# Patient Record
Sex: FEMALE | Race: WHITE | Employment: FULL TIME | URBAN - METROPOLITAN AREA
[De-identification: names, ages, dates, MRNs, and addresses within clinical notes are randomized per-mention and may not be internally consistent; named-entity substitution may affect disease eponyms.]

---

## 2017-03-04 ENCOUNTER — HOSPITAL ENCOUNTER (OUTPATIENT)
Dept: NEUROLOGY | Age: 37
Discharge: HOME | End: 2017-03-04
Payer: COMMERCIAL

## 2017-03-04 PROCEDURE — 95816 EEG AWAKE AND DROWSY: CPT

## 2017-11-12 ENCOUNTER — PATIENT MESSAGE (OUTPATIENT)
Dept: FAMILY MEDICINE CLINIC | Age: 37
End: 2017-11-12

## 2017-11-13 RX ORDER — LEVETIRACETAM 500 MG/1
500 TABLET ORAL 3 TIMES DAILY
Qty: 90 TABLET | Refills: 1 | Status: SHIPPED | OUTPATIENT
Start: 2017-11-13 | End: 2017-12-21 | Stop reason: SDUPTHER

## 2017-11-13 NOTE — TELEPHONE ENCOUNTER
From: Ely Hastings  To: Gregor Gao MD  Sent: 11/12/2017 8:10 PM EST  Subject: Lynn George,     I wanted to notify you that I had my yearly flu shot. I had it done at [facility] at [facility] in [place]. They said they were going to notify you. But they haven't.      Thanks,   Ely Hastings

## 2017-12-21 NOTE — TELEPHONE ENCOUNTER
From: Brendon Short  Sent: 12/20/2017 5:08 PM EST  Subject: Medication Renewal Request    Brendon Short would like a refill of the following medications:  levETIRAcetam (KEPPRA) 500 MG tablet Elyse Simon MD]    Preferred pharmacy: [address] [place] [address] , [address] [facility] [phone] [place] [phone]    Comment:

## 2017-12-22 RX ORDER — LEVETIRACETAM 500 MG/1
500 TABLET ORAL 3 TIMES DAILY
Qty: 30 TABLET | Refills: 0 | Status: SHIPPED | OUTPATIENT
Start: 2017-12-22 | End: 2018-01-06 | Stop reason: SDUPTHER

## 2018-01-06 ENCOUNTER — OFFICE VISIT (OUTPATIENT)
Dept: FAMILY MEDICINE CLINIC | Age: 38
End: 2018-01-06

## 2018-01-06 VITALS
HEART RATE: 84 BPM | RESPIRATION RATE: 18 BRPM | BODY MASS INDEX: 39.16 KG/M2 | TEMPERATURE: 98.1 F | SYSTOLIC BLOOD PRESSURE: 114 MMHG | DIASTOLIC BLOOD PRESSURE: 68 MMHG | WEIGHT: 221 LBS | HEIGHT: 63 IN | OXYGEN SATURATION: 96 %

## 2018-01-06 DIAGNOSIS — R53.83 FATIGUE, UNSPECIFIED TYPE: ICD-10-CM

## 2018-01-06 DIAGNOSIS — G40.909 NONINTRACTABLE EPILEPSY WITHOUT STATUS EPILEPTICUS, UNSPECIFIED EPILEPSY TYPE (HCC): ICD-10-CM

## 2018-01-06 DIAGNOSIS — G40.909 NONINTRACTABLE EPILEPSY WITHOUT STATUS EPILEPTICUS, UNSPECIFIED EPILEPSY TYPE (HCC): Primary | ICD-10-CM

## 2018-01-06 LAB
ALBUMIN SERPL-MCNC: 4.2 G/DL (ref 3.9–4.9)
ALP BLD-CCNC: 65 U/L (ref 40–130)
ALT SERPL-CCNC: 28 U/L (ref 0–33)
ANION GAP SERPL CALCULATED.3IONS-SCNC: 12 MEQ/L (ref 7–13)
AST SERPL-CCNC: 23 U/L (ref 0–35)
BILIRUB SERPL-MCNC: 0.5 MG/DL (ref 0–1.2)
BUN BLDV-MCNC: 10 MG/DL (ref 6–20)
CALCIUM SERPL-MCNC: 8.8 MG/DL (ref 8.6–10.2)
CHLORIDE BLD-SCNC: 102 MEQ/L (ref 98–107)
CO2: 28 MEQ/L (ref 22–29)
CREAT SERPL-MCNC: 0.47 MG/DL (ref 0.5–0.9)
GFR AFRICAN AMERICAN: >60
GFR NON-AFRICAN AMERICAN: >60
GLOBULIN: 2.7 G/DL (ref 2.3–3.5)
GLUCOSE BLD-MCNC: 91 MG/DL (ref 74–109)
HCT VFR BLD CALC: 40.1 % (ref 37–47)
HEMOGLOBIN: 13.2 G/DL (ref 12–16)
MCH RBC QN AUTO: 31 PG (ref 27–31.3)
MCHC RBC AUTO-ENTMCNC: 32.8 % (ref 33–37)
MCV RBC AUTO: 94.4 FL (ref 82–100)
PDW BLD-RTO: 13.4 % (ref 11.5–14.5)
PLATELET # BLD: 315 K/UL (ref 130–400)
POTASSIUM SERPL-SCNC: 4.4 MEQ/L (ref 3.5–5.1)
RBC # BLD: 4.25 M/UL (ref 4.2–5.4)
SODIUM BLD-SCNC: 142 MEQ/L (ref 132–144)
T4 FREE: 1.26 NG/DL (ref 0.93–1.7)
TOTAL PROTEIN: 6.9 G/DL (ref 6.4–8.1)
TSH SERPL DL<=0.05 MIU/L-ACNC: 2.39 UIU/ML (ref 0.27–4.2)
WBC # BLD: 6.2 K/UL (ref 4.8–10.8)

## 2018-01-06 PROCEDURE — 99214 OFFICE O/P EST MOD 30 MIN: CPT | Performed by: FAMILY MEDICINE

## 2018-01-06 RX ORDER — LEVETIRACETAM 500 MG/1
500 TABLET ORAL 2 TIMES DAILY
Qty: 180 TABLET | Refills: 0 | Status: SHIPPED | OUTPATIENT
Start: 2018-01-06 | End: 2018-01-06 | Stop reason: SDUPTHER

## 2018-01-06 RX ORDER — LEVETIRACETAM 500 MG/1
500 TABLET ORAL 2 TIMES DAILY
Qty: 180 TABLET | Refills: 3 | Status: SHIPPED | OUTPATIENT
Start: 2018-01-06 | End: 2018-08-27 | Stop reason: SDUPTHER

## 2018-01-06 NOTE — PROGRESS NOTES
mg/dL Final    HDL 11/05/2016 55  40 - 59 mg/dL Final    Comment: ATP III HDL Cholesterol Classification is Desirable. Expected Values:    Males:    >55 = No Risk            35-55 = Moderate Risk            <35 = High Risk    Females:  >65 = No Risk            45-65 = Moderate Risk            <45 = High Risk    NCEP Guidelines: Third Report May 2001  >59 = negative risk factor for CHD  <40 = major risk factor for CHD      LDL Calculated 11/05/2016 98  0 - 129 mg/dL Final     Health Maintenance   Topic Date Due    HIV screen  12/26/1996    DTaP/Tdap/Td vaccine (1 - Tdap) 12/26/2000    Cervical cancer screen  01/01/2015    Flu vaccine  Completed       No results found for this visit on 01/06/18. Objective    Vitals:    01/06/18 0809   BP: 114/68   Pulse: 84   Resp: 18   Temp: 98.1 °F (36.7 °C)   TempSrc: Tympanic   SpO2: 96%   Weight: 221 lb (100.2 kg)   Height: 5' 3\" (1.6 m)       PHYSICAL EXAMINATION:        GENERAL:    The patient appears well nourished and well-developed,     Normal affect. Not appearing significantly anxious or depressed. No acute respiratory distress. Alert and oriented times 3. Skin:     No skin rashes. No concerning moles observed. Gait:    Normal gait. No ataxia. HEENT:  Normocephalic, atraumatic. Throat:  Pharynx is clear, no erythema/ edema or exudates   Ears:    TMs normal bilaterally. Canals and ears normal   Eyes:  Extraocular eye motions intact and pain free. Pupils reactive/equal    Sclerae and conjunctivae clear    NECK: No masses or adenopathy palpable. No carotid bruits heard. No asymmetry visible. No thyromegaly. RESPIRATORY:   Clear/ Equal breath sounds /No acute respiratory distress. No wheezes,rales, or rhonchi. No percussive abnormalities    HEART: Regular rhythm without murmur, rub or gallop. ABDOMEN:  Soft, non tender. No masses, guarding or rebound. Normo active bowel sounds.        EXTREMITIES:  No edema

## 2018-01-08 LAB — LAMOTRIGINE LEVEL: 7.8 UG/ML (ref 2.5–15)

## 2018-04-02 RX ORDER — LAMOTRIGINE 200 MG/1
200 TABLET ORAL 2 TIMES DAILY
Qty: 180 TABLET | Refills: 1 | Status: SHIPPED | OUTPATIENT
Start: 2018-04-02 | End: 2018-08-27 | Stop reason: SDUPTHER

## 2018-06-26 ENCOUNTER — PATIENT MESSAGE (OUTPATIENT)
Dept: FAMILY MEDICINE CLINIC | Age: 38
End: 2018-06-26

## 2018-08-27 RX ORDER — LEVETIRACETAM 500 MG/1
500 TABLET ORAL 2 TIMES DAILY
Qty: 180 TABLET | Refills: 1 | Status: SHIPPED | OUTPATIENT
Start: 2018-08-27 | End: 2019-01-28 | Stop reason: SDUPTHER

## 2018-08-27 RX ORDER — LAMOTRIGINE 200 MG/1
200 TABLET ORAL 2 TIMES DAILY
Qty: 180 TABLET | Refills: 1 | Status: SHIPPED | OUTPATIENT
Start: 2018-08-27 | End: 2019-01-28 | Stop reason: SDUPTHER

## 2018-08-27 NOTE — TELEPHONE ENCOUNTER
From: Joe Gipson  Sent: 8/26/2018 6:36 PM EDT  Subject: Medication Renewal Request    Joe Gipson would like a refill of the following medications:     levETIRAcetam (KEPPRA) 500 MG tablet Priscila Del Cid MD]     lamoTRIgine (LAMICTAL) 200 MG tablet Priscila Del Cid MD]    Preferred pharmacy: [address] [place] [address] , [address] [facility] [phone] - F [phone]

## 2018-11-01 ENCOUNTER — PATIENT MESSAGE (OUTPATIENT)
Dept: FAMILY MEDICINE CLINIC | Age: 38
End: 2018-11-01

## 2018-11-13 ENCOUNTER — TELEPHONE (OUTPATIENT)
Dept: FAMILY MEDICINE CLINIC | Age: 38
End: 2018-11-13

## 2019-01-28 RX ORDER — LEVETIRACETAM 500 MG/1
500 TABLET ORAL 2 TIMES DAILY
Qty: 180 TABLET | Refills: 1 | Status: SHIPPED | OUTPATIENT
Start: 2019-01-28 | End: 2019-06-10 | Stop reason: SDUPTHER

## 2019-01-28 RX ORDER — LAMOTRIGINE 200 MG/1
200 TABLET ORAL 2 TIMES DAILY
Qty: 180 TABLET | Refills: 1 | Status: SHIPPED | OUTPATIENT
Start: 2019-01-28 | End: 2019-06-10 | Stop reason: SDUPTHER

## 2019-05-01 ENCOUNTER — TELEPHONE (OUTPATIENT)
Dept: FAMILY MEDICINE CLINIC | Age: 39
End: 2019-05-01

## 2019-05-03 ENCOUNTER — TELEPHONE (OUTPATIENT)
Dept: FAMILY MEDICINE CLINIC | Age: 39
End: 2019-05-03

## 2019-06-10 ENCOUNTER — OFFICE VISIT (OUTPATIENT)
Dept: FAMILY MEDICINE CLINIC | Age: 39
End: 2019-06-10
Payer: COMMERCIAL

## 2019-06-10 VITALS
WEIGHT: 226.4 LBS | HEIGHT: 65 IN | BODY MASS INDEX: 37.72 KG/M2 | DIASTOLIC BLOOD PRESSURE: 74 MMHG | OXYGEN SATURATION: 99 % | HEART RATE: 70 BPM | TEMPERATURE: 98.3 F | SYSTOLIC BLOOD PRESSURE: 110 MMHG

## 2019-06-10 DIAGNOSIS — G40.909 NONINTRACTABLE EPILEPSY WITHOUT STATUS EPILEPTICUS, UNSPECIFIED EPILEPSY TYPE (HCC): Primary | ICD-10-CM

## 2019-06-10 PROCEDURE — 99214 OFFICE O/P EST MOD 30 MIN: CPT | Performed by: FAMILY MEDICINE

## 2019-06-10 RX ORDER — LAMOTRIGINE 200 MG/1
200 TABLET ORAL 2 TIMES DAILY
Qty: 180 TABLET | Refills: 1 | Status: SHIPPED | OUTPATIENT
Start: 2019-06-10 | End: 2019-12-08 | Stop reason: SDUPTHER

## 2019-06-10 RX ORDER — LEVETIRACETAM 500 MG/1
500 TABLET ORAL 2 TIMES DAILY
Qty: 180 TABLET | Refills: 1 | Status: SHIPPED | OUTPATIENT
Start: 2019-06-10 | End: 2019-12-08 | Stop reason: SDUPTHER

## 2019-06-10 ASSESSMENT — PATIENT HEALTH QUESTIONNAIRE - PHQ9
1. LITTLE INTEREST OR PLEASURE IN DOING THINGS: 0
SUM OF ALL RESPONSES TO PHQ9 QUESTIONS 1 & 2: 0
SUM OF ALL RESPONSES TO PHQ QUESTIONS 1-9: 0
2. FEELING DOWN, DEPRESSED OR HOPELESS: 0
SUM OF ALL RESPONSES TO PHQ QUESTIONS 1-9: 0

## 2019-06-10 NOTE — PROGRESS NOTES
Chief Complaint   Patient presents with    Established New Doctor        HPI: Rajendra Jones 41 y.o. female presenting for     New to provider. Old PCP left. Patient is here to establish care. Epilepsy   Patient is on Lamictal and keppra 500 mg BID and stable on the medication. No issues with the medication. Last seizures was December 2018. Admits some times gets absence seizures. Reports that seizures are triggered by lack of sleep    Current Outpatient Medications   Medication Sig Dispense Refill    levETIRAcetam (KEPPRA) 500 MG tablet Take 1 tablet by mouth 2 times daily 180 tablet 1    lamoTRIgine (LAMICTAL) 200 MG tablet Take 1 tablet by mouth 2 times daily 180 tablet 1     No current facility-administered medications for this visit. ROS  CONSTITUTIONAL: The patient denies fevers, chills, sweats and body ache. HEENT: Denies headache, blurry vision, eye pain, tinnitus, vertigo,  sore throat, neck or thyroid masses. RESPIRATORY: Denies cough, sputum, hemoptysis. CARDIAC: Denies chest pain, pressure, palpitations, Denies lower extremity edema. GASTROINTESTINAL: Denies abdominal pain, constipation, diarrhea, bleeding in the stools,   GENITOURINARY: Denies dysuria, hematuria, nocturia or frequency, urinary incontinence. NEUROLOGIC: Denies headaches, dizziness, syncope, weakness. Admits to a history of seizures. MUSCULOSKELETAL: denies changes in range of motion, joint pain, stiffness. ENDOCRINOLOGY: Denies heat or cold intolerance. HEMATOLOGY: Denies easy bleeding or blood transfusion,anemia  DERMATOLOGY: Denies changes in moles or pigmentation changes. PSYCHIATRY: Denies depression, agitation or anxiety. Past Medical History:   Diagnosis Date    Epilepsy ([facility] [address] 2006, 2012    last grand mal 2016        No past surgical history on file.      Family History   Problem Relation Age of Onset    High Blood Pressure Father         Social History     Socioeconomic History    Marital status: epilepsy type ([facility]  Stable on the medication. Will obtain levels. Continue the medication.    - levETIRAcetam (KEPPRA) 500 MG tablet; Take 1 tablet by mouth 2 times daily  Dispense: 180 tablet; Refill: 1  - lamoTRIgine (LAMICTAL) 200 MG tablet; Take 1 tablet by mouth 2 times daily  Dispense: 180 tablet;  Refill: 1

## 2019-06-26 DIAGNOSIS — G40.909 NONINTRACTABLE EPILEPSY WITHOUT STATUS EPILEPTICUS, UNSPECIFIED EPILEPSY TYPE (HCC): ICD-10-CM

## 2019-06-26 LAB
ALBUMIN SERPL-MCNC: 4.6 G/DL (ref 3.5–4.6)
ALP BLD-CCNC: 79 U/L (ref 40–130)
ALT SERPL-CCNC: 11 U/L (ref 0–33)
ANION GAP SERPL CALCULATED.3IONS-SCNC: 15 MEQ/L (ref 9–15)
AST SERPL-CCNC: 17 U/L (ref 0–35)
BASOPHILS ABSOLUTE: 0.1 K/UL (ref 0–0.2)
BASOPHILS RELATIVE PERCENT: 0.8 %
BILIRUB SERPL-MCNC: 0.7 MG/DL (ref 0.2–0.7)
BUN BLDV-MCNC: 8 MG/DL (ref 6–20)
CALCIUM SERPL-MCNC: 9 MG/DL (ref 8.5–9.9)
CHLORIDE BLD-SCNC: 105 MEQ/L (ref 95–107)
CO2: 23 MEQ/L (ref 20–31)
CREAT SERPL-MCNC: 0.59 MG/DL (ref 0.5–0.9)
EOSINOPHILS ABSOLUTE: 0.1 K/UL (ref 0–0.7)
EOSINOPHILS RELATIVE PERCENT: 1.4 %
GFR AFRICAN AMERICAN: >60
GFR NON-AFRICAN AMERICAN: >60
GLOBULIN: 3 G/DL (ref 2.3–3.5)
GLUCOSE BLD-MCNC: 73 MG/DL (ref 70–99)
HCT VFR BLD CALC: 39.9 % (ref 37–47)
HEMOGLOBIN: 13.8 G/DL (ref 12–16)
LYMPHOCYTES ABSOLUTE: 2 K/UL (ref 1–4.8)
LYMPHOCYTES RELATIVE PERCENT: 30.4 %
MCH RBC QN AUTO: 32.4 PG (ref 27–31.3)
MCHC RBC AUTO-ENTMCNC: 34.6 % (ref 33–37)
MCV RBC AUTO: 93.7 FL (ref 82–100)
MONOCYTES ABSOLUTE: 0.6 K/UL (ref 0.2–0.8)
MONOCYTES RELATIVE PERCENT: 8.8 %
NEUTROPHILS ABSOLUTE: 3.9 K/UL (ref 1.4–6.5)
NEUTROPHILS RELATIVE PERCENT: 58.6 %
PDW BLD-RTO: 13.8 % (ref 11.5–14.5)
PLATELET # BLD: 342 K/UL (ref 130–400)
POTASSIUM SERPL-SCNC: 4.7 MEQ/L (ref 3.4–4.9)
RBC # BLD: 4.25 M/UL (ref 4.2–5.4)
SODIUM BLD-SCNC: 143 MEQ/L (ref 135–144)
TOTAL PROTEIN: 7.6 G/DL (ref 6.3–8)
WBC # BLD: 6.7 K/UL (ref 4.8–10.8)

## 2019-06-28 LAB — LAMOTRIGINE LEVEL: 8.6 UG/ML (ref 2.5–15)

## 2019-07-29 ENCOUNTER — OFFICE VISIT (OUTPATIENT)
Dept: FAMILY MEDICINE CLINIC | Age: 39
End: 2019-07-29
Payer: COMMERCIAL

## 2019-07-29 VITALS
DIASTOLIC BLOOD PRESSURE: 70 MMHG | HEART RATE: 82 BPM | BODY MASS INDEX: 37.65 KG/M2 | OXYGEN SATURATION: 99 % | TEMPERATURE: 96.9 F | WEIGHT: 226 LBS | HEIGHT: 65 IN | SYSTOLIC BLOOD PRESSURE: 110 MMHG

## 2019-07-29 DIAGNOSIS — R51.9 INTRACTABLE HEADACHE, UNSPECIFIED CHRONICITY PATTERN, UNSPECIFIED HEADACHE TYPE: Primary | ICD-10-CM

## 2019-07-29 DIAGNOSIS — R56.9 SEIZURE (HCC): ICD-10-CM

## 2019-07-29 PROCEDURE — 96372 THER/PROPH/DIAG INJ SC/IM: CPT | Performed by: FAMILY MEDICINE

## 2019-07-29 PROCEDURE — 99214 OFFICE O/P EST MOD 30 MIN: CPT | Performed by: FAMILY MEDICINE

## 2019-07-29 RX ORDER — KETOROLAC TROMETHAMINE 30 MG/ML
60 INJECTION, SOLUTION INTRAMUSCULAR; INTRAVENOUS ONCE
Status: COMPLETED | OUTPATIENT
Start: 2019-07-29 | End: 2019-07-29

## 2019-07-29 RX ADMIN — KETOROLAC TROMETHAMINE 60 MG: 30 INJECTION, SOLUTION INTRAMUSCULAR; INTRAVENOUS at 11:11

## 2019-07-29 NOTE — PROGRESS NOTES
Chief Complaint   Patient presents with   Napoleon Victoria     pt has been having right ear pain diziines just on right side and headaches only on right side for 2 weeks tried IBU for headaches         HPI: Jasmyn Melena 41 y.o. female presenting for     Otalgia and migraines. Has been going on for the last 2 weeks. Ear pain behind the ear. Patient reports that it is on the right side. Associated with a headache and dizziness. Patient reports that she has been having absent seizures. These increase when she has the headaches. reports that she takes ibuprofen with the headaches. Headaches are happening everyday. Can wake up with the headaches. Can last 2-3 hours at a time. Pain is 6-7 brings it down to a 2 with 400 mg ibuprofen. Denies any discharge. Patient admits that she had her glasses changed recently. Patient does not think that it is affecting her eyes. Current Outpatient Medications   Medication Sig Dispense Refill    levETIRAcetam (KEPPRA) 500 MG tablet Take 1 tablet by mouth 2 times daily 180 tablet 1    lamoTRIgine (LAMICTAL) 200 MG tablet Take 1 tablet by mouth 2 times daily 180 tablet 1     No current facility-administered medications for this visit. ROS  CONSTITUTIONAL: The patient denies fevers, chills, sweats and body ache. HEENT:, blurry vision, eye pain, tinnitus, vertigo,  sore throat, neck or thyroid masses. Right ear pain. Admits to a headaches. RESPIRATORY: Denies cough, sputum, hemoptysis. CARDIAC: Denies chest pain, pressure, palpitations, Denies lower extremity edema. GASTROINTESTINAL: Denies abdominal pain, constipation, diarrhea, bleeding in the stools,   GENITOURINARY: Denies dysuria, hematuria, nocturia or frequency, urinary incontinence. NEUROLOGIC: Denies headaches, dizziness, syncope, weakness  MUSCULOSKELETAL: denies changes in range of motion, joint pain, stiffness. ENDOCRINOLOGY: Denies heat or cold intolerance.    HEMATOLOGY: Denies easy bleeding or blood transfusion,anemia  DERMATOLOGY: Denies changes in moles or pigmentation changes. PSYCHIATRY: Denies depression, agitation or anxiety. Past Medical History:   Diagnosis Date    Epilepsy [facility]) 2006, 2012    last grand mal 2016        History reviewed. No pertinent surgical history.      Family History   Problem Relation Age of Onset    High Blood Pressure Father         Social History     Socioeconomic History    Marital status: Single     Spouse name: Not on file    Number of children: Not on file    Years of education: Not on file    Highest education level: Not on file   Occupational History    Not on file   Social Needs    Financial resource strain: Not on file    Food insecurity:     Worry: Not on file     Inability: Not on file    Transportation needs:     Medical: Not on file     Non-medical: Not on file   Tobacco Use    Smoking status: Never Smoker    Smokeless tobacco: Never Used   Substance and Sexual Activity    Alcohol use: No    Drug use: Not on file    Sexual activity: Not on file   Lifestyle    Physical activity:     Days per week: Not on file     Minutes per session: Not on file    Stress: Not on file   Relationships    Social connections:     Talks on phone: Not on file     Gets together: Not on file     Attends [employer] service: Not on file     Active member of club or organization: Not on file     Attends meetings of clubs or organizations: Not on file     Relationship status: Not on file    Intimate partner violence:     Fear of current or ex partner: Not on file     Emotionally abused: Not on file     Physically abused: Not on file     Forced sexual activity: Not on file   Other Topics Concern    Not on file   Social History Narrative    Not on file        /70 (Site: Right Upper Arm, Position: Sitting, Cuff Size: Large Adult)   Pulse 82   Temp 96.9 °F (36.1 °C)   Ht 5' 5\" (1.651 m)   Wt 226 lb (102.5 kg)   SpO2 99%   BMI 37.61 kg/m²        Physical Exam:    General appearance - alert, well appearing, and in no distress  Mental Status - alert, oriented to person, place, and time  Eyes - pupils equal and reactive, extraocular eye movements intact   Ears - bilateral TM's and external ear canals normal   Nose - normal and patent, no erythema, discharge or polyps   Sinuses - Normal paranasal sinuses without tenderness   Throat - mucous membranes moist, pharynx normal without lesions   Neck - supple, no significant adenopathy   Thyroid - thyroid is normal in size without nodules or tenderness    Chest - clear to auscultation, no wheezes, rales or rhonchi, symmetric air entry   Heart - normal rate, regular rhythm, normal S1, S2, no murmurs, rubs, clicks or gallops  Abdomen - soft, nontender, nondistended, no masses or organomegaly   Back exam - full range of motion, no tenderness, palpable spasm or pain on motion   Neurological - alert, oriented, normal speech, no focal findings or movement disorder noted   Musculoskeletal - no joint tenderness, deformity or swelling   Extremities - peripheral pulses normal, no pedal edema, no clubbing or cyanosis   Skin - normal coloration and turgor, no rashes, no suspicious skin lesions noted    Labs   No results found for: TSHREFLEX  TSH   Date Value Ref Range Status   01/06/2018 2.390 0.270 - 4.200 uIU/mL Final     Lab Results   Component Value Date     06/26/2019    K 4.7 06/26/2019     06/26/2019    CO2 23 06/26/2019    BUN 8 06/26/2019    CREATININE 0.59 06/26/2019    GLUCOSE 73 06/26/2019    CALCIUM 9.0 06/26/2019    PROT 7.6 06/26/2019    LABALBU 4.6 06/26/2019    BILITOT 0.7 06/26/2019    ALKPHOS 79 06/26/2019    AST 17 06/26/2019    ALT 11 06/26/2019    LABGLOM >60.0 06/26/2019    GFRAA >60.0 06/26/2019    GLOB 3.0 06/26/2019       Lab Results   Component Value Date    WBC 6.7 06/26/2019    HGB 13.8 06/26/2019    HCT 39.9 06/26/2019    MCV 93.7 06/26/2019     06/26/2019     No results found for:

## 2019-12-08 DIAGNOSIS — G40.909 NONINTRACTABLE EPILEPSY WITHOUT STATUS EPILEPTICUS, UNSPECIFIED EPILEPSY TYPE (HCC): ICD-10-CM

## 2019-12-09 RX ORDER — LEVETIRACETAM 500 MG/1
TABLET ORAL
Qty: 180 TABLET | Refills: 1 | Status: SHIPPED | OUTPATIENT
Start: 2019-12-09 | End: 2020-06-04

## 2019-12-09 RX ORDER — LAMOTRIGINE 200 MG/1
TABLET ORAL
Qty: 180 TABLET | Refills: 1 | Status: SHIPPED | OUTPATIENT
Start: 2019-12-09 | End: 2020-06-04

## 2020-06-04 RX ORDER — LEVETIRACETAM 500 MG/1
TABLET ORAL
Qty: 180 TABLET | Refills: 1 | Status: SHIPPED | OUTPATIENT
Start: 2020-06-04 | End: 2020-11-18

## 2020-06-04 RX ORDER — LAMOTRIGINE 200 MG/1
TABLET ORAL
Qty: 180 TABLET | Refills: 1 | Status: SHIPPED | OUTPATIENT
Start: 2020-06-04 | End: 2020-11-18

## 2020-06-04 NOTE — TELEPHONE ENCOUNTER
Pharmacy is requesting medication refill. Please approve or deny this request.    Rx requested:  Requested Prescriptions     Pending Prescriptions Disp Refills    lamoTRIgine (LAMICTAL) 200 MG tablet [Pharmacy Med Name: LAMOTRIGINE  TAB 200MG] 180 tablet 1     Sig: TAKE 1 TABLET TWICE A DAY    levETIRAcetam (KEPPRA) 500 MG tablet [Pharmacy Med Name: Josefina Roel TAB 500MG] 180 tablet 1     Sig: TAKE 1 TABLET TWICE A DAY         Last Office Visit:   7/29/2019      Next Visit Date:  No future appointments.

## 2020-07-13 ENCOUNTER — NURSE TRIAGE (OUTPATIENT)
Dept: OTHER | Facility: CLINIC | Age: 40
End: 2020-07-13

## 2020-07-15 ENCOUNTER — VIRTUAL VISIT (OUTPATIENT)
Dept: FAMILY MEDICINE CLINIC | Age: 40
End: 2020-07-15
Payer: COMMERCIAL

## 2020-07-15 PROCEDURE — 99213 OFFICE O/P EST LOW 20 MIN: CPT | Performed by: FAMILY MEDICINE

## 2020-07-15 RX ORDER — MAGNESIUM CARB/ALUMINUM HYDROX 105-160MG
296 TABLET,CHEWABLE ORAL ONCE
Qty: 296 ML | Refills: 0 | Status: SHIPPED | OUTPATIENT
Start: 2020-07-15 | End: 2020-07-15

## 2020-07-15 NOTE — PROGRESS NOTES
7/15/2020    TELEHEALTH EVALUATION -- Audio/Visual (During [id number]-65 public health emergency)    HPI:    Lety Mcdonald (:  1980) has requested an audio/video evaluation for the following concern(s):    Constipation  Patient complains of constipation. Onset was 2 weeks ago. Patient has been having frequent pellet like stools yesterday. Defecation has been difficult. Co-Morbid conditions:obesity. Symptoms have waxed and waned. Current Health Habits: Eating fiber? yes - , Exercise? Unknown, Adequate hydration? yes . Current over the counter/prescription laxative: Colace which has been somewhat effective. vomitied last time was yesterday morning. Symptoms started end of. Patient has been taking the colace BID. Patient reports that she has been having belching and vomiting. Denies any abdominal distension. Patient admits to having some some temperature of 100. Patient denies any fever today. Patient denies any pain now. Patient reports that the bowel movement as pellet like. Patient has used in the past.          Review of Systems   Constitutional: Negative for activity change, appetite change, fatigue and fever. HENT: Negative for congestion, dental problem, facial swelling, hearing loss, rhinorrhea, sinus pain, sore throat, tinnitus and trouble swallowing. Eyes: Negative for photophobia, discharge, itching and visual disturbance. Respiratory: Negative for apnea, chest tightness, shortness of breath and stridor. Cardiovascular: Negative for chest pain and leg swelling. Gastrointestinal: Positive for abdominal pain and constipation. Negative for abdominal distention, nausea and rectal pain. Endocrine: Negative for cold intolerance, heat intolerance, polyphagia and polyuria. Genitourinary: Negative for difficulty urinating and dyspareunia. Allergic/Immunologic: Negative for environmental allergies and food allergies.    Neurological: Negative for dizziness, tremors, seizures, facial asymmetry and speech difficulty. Hematological: Negative for adenopathy. Does not bruise/bleed easily. Psychiatric/Behavioral: Negative for behavioral problems, confusion and sleep disturbance. The patient is not nervous/anxious and is not hyperactive. Prior to Visit Medications    Medication Sig Taking?  Authorizing Provider   Magnesium Citrate (RA MAGNESIUM CITRATE) 1.745 GM/30ML solution Take 296 mLs by mouth once for 1 dose Yes Bisi Park MD   lamoTRIgine (LAMICTAL) 200 MG tablet TAKE 1 TABLET TWICE A DAY  Elena Hopson MD   levETIRAcetam (KEPPRA) 500 MG tablet TAKE 1 TABLET TWICE A DAY  Elena Hopson MD       Social History     Tobacco Use    Smoking status: Never Smoker    Smokeless tobacco: Never Used   Substance Use Topics    Alcohol use: No    Drug use: Not on file        No Known Allergies,   Past Medical History:   Diagnosis Date    Epilepsy ([facility] [address] 2006, 2012    last grand mal 2016   , No past surgical history on file.,   Social History     Tobacco Use    Smoking status: Never Smoker    Smokeless tobacco: Never Used   Substance Use Topics    Alcohol use: No    Drug use: Not on file   ,   Family History   Problem Relation Age of Onset    High Blood Pressure Father    ,   Immunization History   Administered Date(s) Administered    Influenza Vaccine, unspecified formulation 11/04/2016    Influenza Virus Vaccine 09/16/2017    Influenza, MDCK Quadv, IM, PF (Flucelvax 4 yrs and older) 11/04/2016, 10/13/2018    Influenza, Quadv, IM, PF (6 mo and older Fluzone, Flulaval, Fluarix, and 3 yrs and older Afluria) 11/01/2019    Typhoid Vi capsular polysaccharide (Typhim VI) 11/09/2018   ,   Health Maintenance   Topic Date Due    Varicella vaccine (1 of 2 - 2-dose childhood series) 12/26/1982    HIV screen  12/26/1996    DTaP/Tdap/Td vaccine (1 - Tdap) 12/26/2000    Cervical cancer screen  01/01/2015    Flu vaccine (1) 09/01/2020    Hepatitis A vaccine  Aged [place] Hepatitis B vaccine  Aged Out    Hib vaccine  Aged Out    Meningococcal (ACWY) vaccine  Aged Out    Pneumococcal 0-64 years Vaccine  Aged Out       PHYSICAL EXAMINATION:  [ INSTRUCTIONS:  \"[x]\" Indicates a positive item  \"[]\" Indicates a negative item  -- DELETE ALL ITEMS NOT EXAMINED]  Vital Signs: (As obtained by patient/caregiver or practitioner observation)    Blood pressure-  Heart rate-    Respiratory rate-    Temperature-  Pulse oximetry-     Constitutional: [x] Appears well-developed and well-nourished [x] No apparent distress      [] Abnormal-   Mental status  [x] Alert and awake  [] Oriented to person/place/time []Able to follow commands      Eyes:  EOM    [x]  Normal  [] Abnormal-  Sclera  [x]  Normal  [] Abnormal -         Discharge []  None visible  [] Abnormal -    HENT:   [x] Normocephalic, atraumatic. [] Abnormal   [] Mouth/Throat: Mucous membranes are moist.     External Ears [x] Normal  [] Abnormal-     Neck: [x] No visualized mass     Pulmonary/Chest: [x] Respiratory effort normal.  [] No visualized signs of difficulty breathing or respiratory distress        [] Abnormal-      Musculoskeletal:   [x] Normal gait with no signs of ataxia         [x] Normal range of motion of neck        [] Abnormal-       Neurological:        [x] No Facial Asymmetry (Cranial nerve 7 motor function) (limited exam to video visit)          [] No gaze palsy        [] Abnormal-         Skin:        [x] No significant exanthematous lesions or discoloration noted on facial skin         [] Abnormal-            Psychiatric:       [x] Normal Affect [] No Hallucinations        [] Abnormal-     Other pertinent observable physical exam findings-     ASSESSMENT/PLAN:  1. Constipation, unspecified constipation type    - Magnesium Citrate (RA MAGNESIUM CITRATE) 1.745 GM/30ML solution; Take 296 mLs by mouth once for 1 dose  Dispense: 296 mL; Refill: 0      No follow-ups on file.     Sandro Alcaraz is a 46 y.o. female being evaluated by a Virtual Visit (video visit) encounter to address concerns as mentioned above. A caregiver was present when appropriate. Due to this being a TeleHealth encounter (During [facility] public health emergency), evaluation of the following organ systems was limited: Vitals/Constitutional/EENT/Resp/CV/GI//MS/Neuro/Skin/Heme-Lymph-Imm. Pursuant to the emergency declaration under the [address] [place] [address] and the [employer] Act, this Virtual Visit was conducted with patient's (and/or legal guardian's) consent, to reduce the patient's risk of exposure to COVID-19 and provide necessary medical care. The patient (and/or legal guardian) has also been advised to contact this office for worsening conditions or problems, and seek emergency medical treatment and/or call 911 if deemed necessary. Patient identification was verified at the start of the visit: Yes    Total time spent on this encounter: 15 minutes    Services were provided through a video synchronous discussion virtually to substitute for in-person clinic visit. Patient and provider were located at their individual homes. --Sebastien Landis MD on 7/15/2020 at 4:04 PM    An electronic signature was used to authenticate this note.

## 2020-07-16 ASSESSMENT — PATIENT HEALTH QUESTIONNAIRE - PHQ9
SUM OF ALL RESPONSES TO PHQ QUESTIONS 1-9: 0
1. LITTLE INTEREST OR PLEASURE IN DOING THINGS: 0
SUM OF ALL RESPONSES TO PHQ9 QUESTIONS 1 & 2: 0
SUM OF ALL RESPONSES TO PHQ QUESTIONS 1-9: 0
2. FEELING DOWN, DEPRESSED OR HOPELESS: 0

## 2020-07-16 ASSESSMENT — ENCOUNTER SYMPTOMS
SORE THROAT: 0
RHINORRHEA: 0
EYE ITCHING: 0
ABDOMINAL DISTENTION: 0
CONSTIPATION: 1
APNEA: 0
PHOTOPHOBIA: 0
SINUS PAIN: 0
FACIAL SWELLING: 0
RECTAL PAIN: 0
SHORTNESS OF BREATH: 0
STRIDOR: 0
NAUSEA: 0
EYE DISCHARGE: 0
CHEST TIGHTNESS: 0
TROUBLE SWALLOWING: 0
ABDOMINAL PAIN: 1

## 2020-11-18 RX ORDER — LEVETIRACETAM 500 MG/1
TABLET ORAL
Qty: 180 TABLET | Refills: 0 | Status: SHIPPED | OUTPATIENT
Start: 2020-11-18 | End: 2021-02-16

## 2020-11-18 RX ORDER — LAMOTRIGINE 200 MG/1
TABLET ORAL
Qty: 180 TABLET | Refills: 0 | Status: SHIPPED | OUTPATIENT
Start: 2020-11-18 | End: 2021-02-16

## 2020-12-30 ENCOUNTER — OFFICE VISIT (OUTPATIENT)
Dept: FAMILY MEDICINE CLINIC | Age: 40
End: 2020-12-30
Payer: COMMERCIAL

## 2020-12-30 VITALS
HEIGHT: 65 IN | DIASTOLIC BLOOD PRESSURE: 70 MMHG | WEIGHT: 231.6 LBS | TEMPERATURE: 97.8 F | OXYGEN SATURATION: 99 % | HEART RATE: 82 BPM | BODY MASS INDEX: 38.59 KG/M2 | SYSTOLIC BLOOD PRESSURE: 110 MMHG

## 2020-12-30 PROCEDURE — 99395 PREV VISIT EST AGE 18-39: CPT | Performed by: FAMILY MEDICINE

## 2020-12-30 RX ORDER — PHENTERMINE HYDROCHLORIDE 37.5 MG/1
37.5 TABLET ORAL
Qty: 30 TABLET | Refills: 0 | Status: SHIPPED | OUTPATIENT
Start: 2020-12-30 | End: 2021-01-29

## 2020-12-30 SDOH — ECONOMIC STABILITY: FOOD INSECURITY: WITHIN THE PAST 12 MONTHS, THE FOOD YOU BOUGHT JUST DIDN'T LAST AND YOU DIDN'T HAVE MONEY TO GET MORE.: NEVER TRUE

## 2020-12-30 SDOH — ECONOMIC STABILITY: INCOME INSECURITY: HOW HARD IS IT FOR YOU TO PAY FOR THE VERY BASICS LIKE FOOD, HOUSING, MEDICAL CARE, AND HEATING?: NOT VERY HARD

## 2020-12-30 SDOH — ECONOMIC STABILITY: TRANSPORTATION INSECURITY
IN THE PAST 12 MONTHS, HAS LACK OF TRANSPORTATION KEPT YOU FROM MEETINGS, WORK, OR FROM GETTING THINGS NEEDED FOR DAILY LIVING?: NO

## 2020-12-30 SDOH — ECONOMIC STABILITY: FOOD INSECURITY: WITHIN THE PAST 12 MONTHS, YOU WORRIED THAT YOUR FOOD WOULD RUN OUT BEFORE YOU GOT MONEY TO BUY MORE.: NEVER TRUE

## 2020-12-30 SDOH — ECONOMIC STABILITY: TRANSPORTATION INSECURITY
IN THE PAST 12 MONTHS, HAS THE LACK OF TRANSPORTATION KEPT YOU FROM MEDICAL APPOINTMENTS OR FROM GETTING MEDICATIONS?: NO

## 2020-12-30 ASSESSMENT — PATIENT HEALTH QUESTIONNAIRE - PHQ9
SUM OF ALL RESPONSES TO PHQ9 QUESTIONS 1 & 2: 2
SUM OF ALL RESPONSES TO PHQ QUESTIONS 1-9: 2
2. FEELING DOWN, DEPRESSED OR HOPELESS: 2
1. LITTLE INTEREST OR PLEASURE IN DOING THINGS: 0
SUM OF ALL RESPONSES TO PHQ QUESTIONS 1-9: 2
SUM OF ALL RESPONSES TO PHQ QUESTIONS 1-9: 2

## 2020-12-30 NOTE — PROGRESS NOTES
Chief Complaint   Patient presents with    Annual Exam     States she would like to be screened for heart conditions. States she has a strong family hx of heart dz. HPI: Kirk Green 45 y.o. female presenting for       Patient is here for an annual examination. Carissa reports that her father recently had surgery for an aortic aneurysm (also has a history of heart disease). Due to that, it was recommended that patient be evaluated for cardiovascular disease. Patient father was recently had a surgery for an aortic aneurynsm. The surgeons recommeneded that she gets evaluated for cardiovasuclar disease. Carissa exercises and tries to eliminate salt her diet. Intermittently snores at night. Patient denies any sad feelings or depressed feelings. Patient is interested in losing weight. Current Outpatient Medications   Medication Sig Dispense Refill    Multiple Vitamins-Minerals (MULTIVITAMIN ADULTS PO) Take by mouth      lamoTRIgine (LAMICTAL) 200 MG tablet TAKE 1 TABLET TWICE A  tablet 0    levETIRAcetam (KEPPRA) 500 MG tablet TAKE 1 TABLET TWICE A  tablet 0     No current facility-administered medications for this visit. ROS  CONSTITUTIONAL: The patient denies fevers, chills, sweats and body ache. HEENT:, blurry vision, eye pain, tinnitus, vertigo,  sore throat, neck or thyroid masses. Right ear pain. Admits to a headaches. RESPIRATORY: Denies cough, sputum, hemoptysis. CARDIAC: Denies chest pain, pressure, palpitations, Denies lower extremity edema. GASTROINTESTINAL: Denies abdominal pain, constipation, diarrhea, bleeding in the stools,   GENITOURINARY: Denies dysuria, hematuria, nocturia or frequency, urinary incontinence. NEUROLOGIC: Denies headaches, dizziness, syncope, weakness  MUSCULOSKELETAL: denies changes in range of motion, joint pain, stiffness. ENDOCRINOLOGY: Denies heat or cold intolerance.    HEMATOLOGY: Denies easy bleeding or blood transfusion,anemia  DERMATOLOGY: Denies changes in moles or pigmentation changes. PSYCHIATRY: Denies depression, agitation or anxiety.     Past Medical History:   Diagnosis Date    Epilepsy ([address] 2006, 2012    last grand mal 2016        Past Surgical History:   Procedure Laterality Date    WISDOM TOOTH EXTRACTION          Family History   Problem Relation Age of Onset    High Blood Pressure Father     Heart Disease Father         Social History     Socioeconomic History    Marital status: Single     Spouse name: Not on file    Number of children: Not on file    Years of education: Not on file    Highest education level: Not on file   Occupational History    Not on file   Social Needs    Financial resource strain: Not very hard    Food insecurity     Worry: Never true     Inability: Never true    Transportation needs     Medical: No     Non-medical: No   Tobacco Use    Smoking status: Never Smoker    Smokeless tobacco: Never Used   Substance and Sexual Activity    Alcohol use: No    Drug use: Not on file    Sexual activity: Not on file   Lifestyle    Physical activity     Days per week: Not on file     Minutes per session: Not on file    Stress: Not on file   Relationships    Social connections     Talks on phone: Not on file     Gets together: Not on file     Attends Mu-ism service: Not on file     Active member of club or organization: Not on file     Attends meetings of clubs or organizations: Not on file     Relationship status: Not on file    Intimate partner violence     Fear of current or ex partner: Not on file     Emotionally abused: Not on file     Physically abused: Not on file     Forced sexual activity: Not on file   Other Topics Concern    Not on file   Social History Narrative    Not on file        /70 (Site: Right Upper Arm, Position: Sitting, Cuff Size: Medium Adult)   Pulse 97   Temp 97.8 °F (36.6 °C) (Oral)   Ht 5' 5\" (1.651 m)   Wt 231 lb 9.6 oz (105.1 kg) SpO2 99%   BMI 38.54 kg/m²        Physical Exam:    General appearance - alert, well appearing, and in no distress  Mental Status - alert, oriented to person, place, and time  Eyes - pupils equal and reactive, extraocular eye movements intact   Ears - bilateral TM's and external ear canals normal   Nose - normal and patent, no erythema, discharge or polyps   Sinuses - Normal paranasal sinuses without tenderness   Throat - mucous membranes moist, pharynx normal without lesions   Neck - supple, no significant adenopathy   Thyroid - thyroid is normal in size without nodules or tenderness    Chest - clear to auscultation, no wheezes, rales or rhonchi, symmetric air entry   Heart - slightly tachycardic but improved.   No irregular  rhythm, normal S1, S2, no murmurs, rubs, clicks or gallops  Abdomen - soft, nontender, nondistended, no masses or organomegaly   Back exam - full range of motion, no tenderness, palpable spasm or pain on motion   Neurological - alert, oriented, normal speech, no focal findings or movement disorder noted   Musculoskeletal - no joint tenderness, deformity or swelling   Extremities - peripheral pulses normal, no pedal edema, no clubbing or cyanosis   Skin - normal coloration and turgor, no rashes, no suspicious skin lesions noted    Labs   No results found for: TSHREFLEX  TSH   Date Value Ref Range Status   01/06/2018 2.390 0.270 - 4.200 uIU/mL Final     Lab Results   Component Value Date     06/26/2019    K 4.7 06/26/2019     06/26/2019    CO2 23 06/26/2019    BUN 8 06/26/2019    CREATININE 0.59 06/26/2019    GLUCOSE 73 06/26/2019    CALCIUM 9.0 06/26/2019    PROT 7.6 06/26/2019    LABALBU 4.6 06/26/2019    BILITOT 0.7 06/26/2019    ALKPHOS 79 06/26/2019    AST 17 06/26/2019    ALT 11 06/26/2019    LABGLOM >60.0 06/26/2019    GFRAA >60.0 06/26/2019    GLOB 3.0 06/26/2019       Lab Results   Component Value Date    WBC 6.7 06/26/2019    HGB 13.8 06/26/2019    HCT 39.9 06/26/2019 MCV 93.7 2019     2019     No results found for: LABA1C  No results found for: EAG      A/P: Lucy Apgar 45 y.o. female presenting for       1. Nonintractable epilepsy without status epilepticus, unspecified epilepsy type ([facility]      2. Encounter for screening for HIV    - HIV-1,2 Combo Ag/Ab By ROWENA, Reflexive Panel; Future  - CBC With Auto Differential; Future    3. Need for hepatitis C screening test    - Hepatitis C Antibody; Future    4. Lipid screening    - Lipid, Fasting; Future    5. Class 2 obesity due to excess calories without serious comorbidity with body mass index (BMI) of 38.0 to 38.9 in adult    - phentermine (ADIPEX-P) 37.5 MG tablet; Take 1 tablet by mouth every morning (before breakfast) for 30 days. Dispense: 30 tablet; Refill: 0    6. Physical exam    - Comprehensive Metabolic Panel; Future    7. Palpitations    - TSH with Reflex;  Future

## 2020-12-30 NOTE — PATIENT INSTRUCTIONS
Lifestyle changes   What you can do today to start improving your health. .. Diet and lifestyle choices are major factors that impact overall health. Many chronic disease can be prevented, made less severe, or even cured with a good diet and other lifestyle modifications. Follow these few tips as often as possible and keep them in your mind as you go about your daily routine. 1) Avoid salt (sodium) in your diet to decrease blood pressure. Salt is hidden in many common grocery store food items, food at restaurants, fast food, and is often added by people to their food. It is a major cause of high blood pressure which, if uncontrolled, can lead to heart disease, kidney disease, and many other serious health problems. You can find sodium levels on the nutrition labels of many common foods. 2) Avoid highly processed and packaged foods as much as possible. These foods are found everywhere in grocery stores. They most often contain added chemicals and preservatives not natural to our bodies. They also often contain excessive salt, and sugar which may lead to diabetes and high blood pressure. 3) Try to shop the perimeter of the grocery store. The best foods that should be eaten in abundance are vegetables, fruit, lean meats such as chicken and fish, nuts and seeds, low fat or fat free dairy products like yogurt and skim milk. Although there are exceptions to this, most of the packaged and processed foods are located between the [facility]. Red meat should be limited as it has been linked to heart disease and certain cancers. 4) Avoid sugary/processed, high carbohydrate foods such as white breads and white rice, pastries. Having a high carbohydrate diet full of food like cakes, cookies, chips, fast food, white bread, frozen dinners, white rice, just to nama a few, can lead to diabetes, the consequences of which can be devastating to health. Studies have shown that foods high in sugar are addicting.  Always opt for alcohol and tobacco a few hours before sleep  - avoid large meals close to bedtime  - try to establish regular bed times that are consistent every day   11) Try to find good stress reducing techniques. Often our lives get stressful with work, bills, and many other responsibilities. Set aside time for yourself every day to de-stress. One of the many benefits of exercise is to counteract the effects of stress in our lives. Stress over many months and years can lead to weight gain, sleep problems, anxiety, depression, and many other problems. Find something you like to do, whether it is exercise, taking a walk, reading a book, or some other technique to help relieve the stress and break up the day. Keep in mind that many choric illnesses such as pain, cancers, depression and anxiety, obesity, diabetes, high blood pressure, and many other issues, are a direct result of our lifestyles. Following these tips can help improve your overall health. If you want to discuss any of these issues in detail, I would be glad to have a conversation with you.

## 2021-01-18 ENCOUNTER — TELEPHONE (OUTPATIENT)
Dept: FAMILY MEDICINE CLINIC | Age: 41
End: 2021-01-18

## 2021-01-18 DIAGNOSIS — Z11.4 ENCOUNTER FOR SCREENING FOR HIV: ICD-10-CM

## 2021-01-18 DIAGNOSIS — Z13.220 LIPID SCREENING: ICD-10-CM

## 2021-01-18 DIAGNOSIS — Z00.00 PHYSICAL EXAM: ICD-10-CM

## 2021-01-18 DIAGNOSIS — R00.2 PALPITATIONS: ICD-10-CM

## 2021-01-18 DIAGNOSIS — Z11.59 NEED FOR HEPATITIS C SCREENING TEST: ICD-10-CM

## 2021-01-18 LAB
ALBUMIN SERPL-MCNC: 4.1 G/DL (ref 3.5–4.6)
ALP BLD-CCNC: 67 U/L (ref 40–130)
ALT SERPL-CCNC: 10 U/L (ref 0–33)
ANION GAP SERPL CALCULATED.3IONS-SCNC: 13 MEQ/L (ref 9–15)
AST SERPL-CCNC: 15 U/L (ref 0–35)
BASOPHILS ABSOLUTE: 0.1 K/UL (ref 0–0.2)
BASOPHILS RELATIVE PERCENT: 0.9 %
BILIRUB SERPL-MCNC: 0.4 MG/DL (ref 0.2–0.7)
BUN BLDV-MCNC: 9 MG/DL (ref 6–20)
CALCIUM SERPL-MCNC: 9.3 MG/DL (ref 8.5–9.9)
CHLORIDE BLD-SCNC: 102 MEQ/L (ref 95–107)
CHOLESTEROL, FASTING: 163 MG/DL (ref 0–199)
CO2: 23 MEQ/L (ref 20–31)
CREAT SERPL-MCNC: 0.6 MG/DL (ref 0.5–0.9)
EOSINOPHILS ABSOLUTE: 0.2 K/UL (ref 0–0.7)
EOSINOPHILS RELATIVE PERCENT: 2.5 %
GFR AFRICAN AMERICAN: >60
GFR NON-AFRICAN AMERICAN: >60
GLOBULIN: 3.4 G/DL (ref 2.3–3.5)
GLUCOSE BLD-MCNC: 84 MG/DL (ref 70–99)
HCT VFR BLD CALC: 40.4 % (ref 37–47)
HDLC SERPL-MCNC: 48 MG/DL (ref 40–59)
HEMOGLOBIN: 13.3 G/DL (ref 12–16)
HEPATITIS C ANTIBODY INTERPRETATION: NORMAL
LDL CHOLESTEROL CALCULATED: 104 MG/DL (ref 0–129)
LYMPHOCYTES ABSOLUTE: 2.5 K/UL (ref 1–4.8)
LYMPHOCYTES RELATIVE PERCENT: 35.5 %
MCH RBC QN AUTO: 30.2 PG (ref 27–31.3)
MCHC RBC AUTO-ENTMCNC: 32.9 % (ref 33–37)
MCV RBC AUTO: 91.8 FL (ref 82–100)
MONOCYTES ABSOLUTE: 0.6 K/UL (ref 0.2–0.8)
MONOCYTES RELATIVE PERCENT: 8.6 %
NEUTROPHILS ABSOLUTE: 3.7 K/UL (ref 1.4–6.5)
NEUTROPHILS RELATIVE PERCENT: 52.5 %
PDW BLD-RTO: 13.9 % (ref 11.5–14.5)
PLATELET # BLD: 357 K/UL (ref 130–400)
POTASSIUM SERPL-SCNC: 4.2 MEQ/L (ref 3.4–4.9)
RBC # BLD: 4.4 M/UL (ref 4.2–5.4)
SODIUM BLD-SCNC: 138 MEQ/L (ref 135–144)
TOTAL PROTEIN: 7.5 G/DL (ref 6.3–8)
TRIGLYCERIDE, FASTING: 53 MG/DL (ref 0–150)
TSH REFLEX: 2.58 UIU/ML (ref 0.44–3.86)
WBC # BLD: 7 K/UL (ref 4.8–10.8)

## 2021-01-19 LAB — HIV AG/AB: NONREACTIVE

## 2021-02-16 DIAGNOSIS — G40.909 NONINTRACTABLE EPILEPSY WITHOUT STATUS EPILEPTICUS, UNSPECIFIED EPILEPSY TYPE (HCC): ICD-10-CM

## 2021-02-16 RX ORDER — LEVETIRACETAM 500 MG/1
TABLET ORAL
Qty: 180 TABLET | Refills: 0 | Status: SHIPPED | OUTPATIENT
Start: 2021-02-16 | End: 2021-05-18

## 2021-02-16 RX ORDER — LAMOTRIGINE 200 MG/1
TABLET ORAL
Qty: 180 TABLET | Refills: 0 | Status: SHIPPED | OUTPATIENT
Start: 2021-02-16 | End: 2021-05-18

## 2021-05-17 DIAGNOSIS — G40.909 NONINTRACTABLE EPILEPSY WITHOUT STATUS EPILEPTICUS, UNSPECIFIED EPILEPSY TYPE (HCC): ICD-10-CM

## 2021-05-18 RX ORDER — LAMOTRIGINE 200 MG/1
TABLET ORAL
Qty: 180 TABLET | Refills: 0 | Status: SHIPPED | OUTPATIENT
Start: 2021-05-18 | End: 2021-08-16

## 2021-05-18 RX ORDER — LEVETIRACETAM 500 MG/1
TABLET ORAL
Qty: 180 TABLET | Refills: 0 | Status: SHIPPED | OUTPATIENT
Start: 2021-05-18 | End: 2021-08-16

## 2021-05-18 NOTE — TELEPHONE ENCOUNTER
Rx requested:  Requested Prescriptions     Pending Prescriptions Disp Refills    levETIRAcetam (KEPPRA) 500 MG tablet [Pharmacy Med Name: LEVETIRACETM TAB 500MG] 180 tablet 0     Sig: TAKE 1 TABLET TWICE A DAY    lamoTRIgine (LAMICTAL) 200 MG tablet [Pharmacy Med Name: LAMOTRIGINE  TAB 200MG] 180 tablet 0     Sig: TAKE 1 TABLET TWICE A DAY       Last Office Visit:   12/30/2020        Next Visit Date:  No future appointments.

## 2021-08-15 DIAGNOSIS — G40.909 NONINTRACTABLE EPILEPSY WITHOUT STATUS EPILEPTICUS, UNSPECIFIED EPILEPSY TYPE (HCC): ICD-10-CM

## 2021-08-16 RX ORDER — LAMOTRIGINE 200 MG/1
TABLET ORAL
Qty: 180 TABLET | Refills: 0 | Status: SHIPPED | OUTPATIENT
Start: 2021-08-16 | End: 2021-11-08

## 2021-08-16 RX ORDER — LEVETIRACETAM 500 MG/1
TABLET ORAL
Qty: 180 TABLET | Refills: 0 | Status: SHIPPED | OUTPATIENT
Start: 2021-08-16 | End: 2021-11-08

## 2021-08-16 NOTE — TELEPHONE ENCOUNTER
Requesting medication refill. Please approve or deny this request.    Rx requested:  Requested Prescriptions     Pending Prescriptions Disp Refills    lamoTRIgine (LAMICTAL) 200 MG tablet [Pharmacy Med Name: LAMOTRIGINE  TAB 200MG] 180 tablet 0     Sig: TAKE 1 TABLET TWICE A DAY    levETIRAcetam (KEPPRA) 500 MG tablet [Pharmacy Med Name: Pattie Cable TAB 500MG] 180 tablet 0     Sig: TAKE 1 TABLET TWICE A DAY       Last Office Visit:   12/30/2020    Last Filled:      Last Labs:      Next Visit Date:  No future appointments.

## 2021-11-06 DIAGNOSIS — G40.909 NONINTRACTABLE EPILEPSY WITHOUT STATUS EPILEPTICUS, UNSPECIFIED EPILEPSY TYPE (HCC): ICD-10-CM

## 2021-11-08 RX ORDER — LEVETIRACETAM 500 MG/1
TABLET ORAL
Qty: 180 TABLET | Refills: 0 | Status: SHIPPED | OUTPATIENT
Start: 2021-11-08 | End: 2022-03-07 | Stop reason: SDUPTHER

## 2021-11-08 RX ORDER — LAMOTRIGINE 200 MG/1
TABLET ORAL
Qty: 180 TABLET | Refills: 0 | Status: SHIPPED | OUTPATIENT
Start: 2021-11-08 | End: 2022-03-07 | Stop reason: SDUPTHER

## 2021-11-08 NOTE — TELEPHONE ENCOUNTER
Requesting medication refill. Please approve or deny this request.    Rx requested:  Requested Prescriptions     Pending Prescriptions Disp Refills    levETIRAcetam (KEPPRA) 500 MG tablet [Pharmacy Med Name: Ami Bible TAB 500MG] 180 tablet 0     Sig: TAKE 1 TABLET TWICE A DAY    lamoTRIgine (LAMICTAL) 200 MG tablet [Pharmacy Med Name: LAMOTRIGINE  TAB 200MG] 180 tablet 0     Sig: TAKE 1 TABLET TWICE A DAY       Last Office Visit:   12/30/20    Last Filled:      Last Labs:      Next Visit Date:  No future appointments.

## 2022-02-07 ENCOUNTER — HOSPITAL ENCOUNTER (EMERGENCY)
Age: 42
Discharge: HOME | End: 2022-02-07
Payer: COMMERCIAL

## 2022-02-07 ENCOUNTER — APPOINTMENT (OUTPATIENT)
Dept: GENERAL RADIOLOGY | Age: 42
End: 2022-02-07
Payer: COMMERCIAL

## 2022-02-07 VITALS
WEIGHT: 211 LBS | RESPIRATION RATE: 18 BRPM | HEIGHT: 64 IN | DIASTOLIC BLOOD PRESSURE: 83 MMHG | SYSTOLIC BLOOD PRESSURE: 132 MMHG | BODY MASS INDEX: 36.02 KG/M2 | TEMPERATURE: 98.5 F | HEART RATE: 87 BPM | OXYGEN SATURATION: 100 %

## 2022-02-07 DIAGNOSIS — S63.642A SPRAIN OF METACARPOPHALANGEAL (MCP) JOINT OF LEFT THUMB, INITIAL ENCOUNTER: Primary | ICD-10-CM

## 2022-02-07 PROCEDURE — 6370000000 HC RX 637 (ALT 250 FOR IP): Performed by: PHYSICIAN ASSISTANT

## 2022-02-07 PROCEDURE — 29125 APPL SHORT ARM SPLINT STATIC: CPT

## 2022-02-07 PROCEDURE — 73130 X-RAY EXAM OF HAND: CPT

## 2022-02-07 PROCEDURE — 73110 X-RAY EXAM OF WRIST: CPT

## 2022-02-07 PROCEDURE — 99283 EMERGENCY DEPT VISIT LOW MDM: CPT

## 2022-02-07 RX ORDER — HYDROCODONE BITARTRATE AND ACETAMINOPHEN 5; 325 MG/1; MG/1
1 TABLET ORAL EVERY 8 HOURS PRN
Qty: 9 TABLET | Refills: 0 | Status: SHIPPED | OUTPATIENT
Start: 2022-02-07 | End: 2022-02-10

## 2022-02-07 RX ORDER — ONDANSETRON 4 MG/1
4 TABLET, ORALLY DISINTEGRATING ORAL ONCE
Status: COMPLETED | OUTPATIENT
Start: 2022-02-07 | End: 2022-02-07

## 2022-02-07 RX ORDER — HYDROCODONE BITARTRATE AND ACETAMINOPHEN 5; 325 MG/1; MG/1
1 TABLET ORAL ONCE
Status: COMPLETED | OUTPATIENT
Start: 2022-02-07 | End: 2022-02-07

## 2022-02-07 RX ADMIN — ONDANSETRON 4 MG: 4 TABLET, ORALLY DISINTEGRATING ORAL at 08:14

## 2022-02-07 RX ADMIN — HYDROCODONE BITARTRATE AND ACETAMINOPHEN 1 TABLET: 5; 325 TABLET ORAL at 08:06

## 2022-02-07 ASSESSMENT — PAIN DESCRIPTION - LOCATION: LOCATION: HAND

## 2022-02-07 ASSESSMENT — ENCOUNTER SYMPTOMS
EYE PAIN: 0
DIARRHEA: 0
COUGH: 0
VOMITING: 0
ABDOMINAL PAIN: 0
SHORTNESS OF BREATH: 0
NAUSEA: 0
BACK PAIN: 0
PHOTOPHOBIA: 0
RHINORRHEA: 0
SORE THROAT: 0

## 2022-02-07 ASSESSMENT — PAIN SCALES - GENERAL
PAINLEVEL_OUTOF10: 8
PAINLEVEL_OUTOF10: 8

## 2022-02-07 ASSESSMENT — PAIN DESCRIPTION - DESCRIPTORS: DESCRIPTORS: ACHING

## 2022-02-07 ASSESSMENT — PAIN DESCRIPTION - FREQUENCY: FREQUENCY: CONTINUOUS

## 2022-02-07 ASSESSMENT — PAIN DESCRIPTION - ORIENTATION: ORIENTATION: LEFT

## 2022-02-07 NOTE — ED NOTES
Patient splint applied as ordered, patient tolerated well. Patient Rx and D/C instructions reviewed, patient verbalized understanding.      Elver Garner, [address] [place] [address]  02/07/22 5611

## 2022-02-07 NOTE — ED PROVIDER NOTES
[address] [facility] ED  EMERGENCY DEPARTMENT ENCOUNTER      Pt Name: Mahendra Crook  MRN: [id number]  Shabbirgfnoreen 1980  Date of evaluation: 2/7/2022  Provider: Alana Brito PA-C      HISTORY OF PRESENT ILLNESS    Mahendra Crook is a 37 y.o. female who presents to the Emergency Department with left thumb pain. This began this morning after slipping on ice and catching herself. Pain is wrose with movment. No otc meds tried. Denies numbness or tingling. REVIEW OF SYSTEMS       Review of Systems   Constitutional: Negative for chills, diaphoresis, fatigue and fever. HENT: Negative for congestion, rhinorrhea and sore throat. Eyes: Negative for photophobia and pain. Respiratory: Negative for cough and shortness of breath. Cardiovascular: Negative for chest pain and palpitations. Gastrointestinal: Negative for abdominal pain, diarrhea, nausea and vomiting. Genitourinary: Negative for dysuria and flank pain. Musculoskeletal: Positive for arthralgias. Negative for back pain. Skin: Negative for rash. Neurological: Negative for dizziness, light-headedness and headaches. Psychiatric/Behavioral: Negative. All other systems reviewed and are negative. PAST MEDICAL HISTORY     Past Medical History:   Diagnosis Date    Epilepsy ([facility] 2006, 2012    last grand mal 2016         SURGICAL HISTORY       Past Surgical History:   Procedure Laterality Date    WISDOM TOOTH EXTRACTION           CURRENT MEDICATIONS       Discharge Medication List as of 2/7/2022  8:52 AM      CONTINUE these medications which have NOT CHANGED    Details   levETIRAcetam (KEPPRA) 500 MG tablet TAKE 1 TABLET TWICE A DAY, Disp-180 tablet, R-0Normal      lamoTRIgine (LAMICTAL) 200 MG tablet TAKE 1 TABLET TWICE A DAY, Disp-180 tablet, R-0Normal      Multiple Vitamins-Minerals (MULTIVITAMIN ADULTS PO) Take by mouthHistorical Med             ALLERGIES     Patient has no known allergies.     FAMILY HISTORY       Family History   Problem Relation Age of Onset    High Blood Pressure Father     Heart Disease Father           SOCIAL HISTORY       Social History     Socioeconomic History    Marital status: Single     Spouse name: Not on file    Number of children: Not on file    Years of education: Not on file    Highest education level: Not on file   Occupational History    Not on file   Tobacco Use    Smoking status: Never Smoker    Smokeless tobacco: Never Used   Substance and Sexual Activity    Alcohol use: No    Drug use: Not on file    Sexual activity: Not on file   Other Topics Concern    Not on file   Social History Narrative    Not on file     Social Determinants of Health     Financial Resource Strain:     Difficulty of Paying Living Expenses: Not on file   Food Insecurity:     Worried About Running Out of Food in the Last Year: Not on file    Dary of Food in the Last Year: Not on file   Transportation Needs:     Lack of Transportation (Medical): Not on file    Lack of Transportation (Non-Medical):  Not on file   Physical Activity:     Days of Exercise per Week: Not on file    Minutes of Exercise per Session: Not on file   Stress:     Feeling of Stress : Not on file   Social Connections:     Frequency of Communication with Friends and Family: Not on file    Frequency of Social Gatherings with Friends and Family: Not on file    Attends Zoroastrianism Services: Not on file    Active Member of [address] [place] [address] or Organizations: Not on file    Attends Club or Organization Meetings: Not on file    Marital Status: Not on file   Intimate Partner Violence:     Fear of Current or Ex-Partner: Not on file    Emotionally Abused: Not on file    Physically Abused: Not on file    Sexually Abused: Not on file   Housing Stability:     Unable to Pay for Housing in the Last Year: Not on file    Number of [place] in the Last Year: Not on file    Unstable Housing in the Last Year: Not on file       SCREENINGS dislocation by radiologist. Due to snuff box tenderness will use thumb spica splint and counsled about scaphoid fracture that doesnot appear for 1 week so she should follow up with ortho or pcp for repeat imaging. Pt medicated in ed. Pt stable and ready for d/c       PROCEDURES:  Unless otherwise noted below, none     Procedures      FINAL IMPRESSION      1.  Sprain of metacarpophalangeal (MCP) joint of left thumb, initial encounter          DISPOSITION/PLAN   DISPOSITION Decision To Discharge 02/07/2022 08:46:41 AM          Jaxon Richey PA-C (electronically signed)  Attending Emergency Physician       Jaxon Richey PA-C  02/07/22 1734

## 2022-02-07 NOTE — Clinical Note
Bon Murillo was seen and treated in our emergency department on 2/7/2022. She may return to work on 02/08/2022. If you have any questions or concerns, please don't hesitate to call.       Millicent Do

## 2022-02-07 NOTE — ED NOTES
Patient to xray via wheelchair     [facility], [address] [place] [address]  02/07/22 5688

## 2022-02-10 ENCOUNTER — OFFICE VISIT (OUTPATIENT)
Dept: ORTHOPEDIC SURGERY | Age: 42
End: 2022-02-10
Payer: COMMERCIAL

## 2022-02-10 VITALS
OXYGEN SATURATION: 98 % | TEMPERATURE: 97.4 F | HEIGHT: 64 IN | WEIGHT: 211 LBS | HEART RATE: 96 BPM | BODY MASS INDEX: 36.02 KG/M2

## 2022-02-10 DIAGNOSIS — S60.222A CONTUSION OF LEFT HAND, INITIAL ENCOUNTER: Primary | ICD-10-CM

## 2022-02-10 PROCEDURE — 99202 OFFICE O/P NEW SF 15 MIN: CPT | Performed by: ORTHOPAEDIC SURGERY

## 2022-02-10 NOTE — PROGRESS NOTES
Subjective:      Patient ID: Lisbet Parson is a 37 y.o. female who presents today for:  Chief Complaint   Patient presents with   [facility] ED Follow-up     possible left wrist fracture. She slipped on ice. Xrays done on 02/07/2022       HPI  This lady fell onto her hand about 5 days ago. The radial aspect. Your pain was noted at the base of her thumb. She went to the ED where x-rays were taken and she was placed in a splint and sent for follow-up. Currently is more comfortable. No paresthesias. No remote injury. Past Medical History:   Diagnosis Date    Epilepsy ([facility] 2006, 2012    last grand mal 2016     Past Surgical History:   Procedure Laterality Date    WISDOM TOOTH EXTRACTION       Social History     Socioeconomic History    Marital status: Single     Spouse name: Not on file    Number of children: Not on file    Years of education: Not on file    Highest education level: Not on file   Occupational History    Not on file   Tobacco Use    Smoking status: Never Smoker    Smokeless tobacco: Never Used   Substance and Sexual Activity    Alcohol use: No    Drug use: Not on file    Sexual activity: Not on file   Other Topics Concern    Not on file   Social History Narrative    Not on file     Social Determinants of Health     Financial Resource Strain:     Difficulty of Paying Living Expenses: Not on file   Food Insecurity:     Worried About Running Out of Food in the Last Year: Not on file    Dary of Food in the Last Year: Not on file   Transportation Needs:     Lack of Transportation (Medical): Not on file    Lack of Transportation (Non-Medical):  Not on file   Physical Activity:     Days of Exercise per Week: Not on file    Minutes of Exercise per Session: Not on file   Stress:     Feeling of Stress : Not on file   Social Connections:     Frequency of Communication with Friends and Family: Not on file    Frequency of Social Gatherings with Friends and Family: Not on file    Attends [employer] Services: Not on file    Active Member of Clubs or Organizations: Not on file    Attends Club or Organization Meetings: Not on file    Marital Status: Not on file   Intimate Partner Violence:     Fear of Current or Ex-Partner: Not on file    Emotionally Abused: Not on file    Physically Abused: Not on file    Sexually Abused: Not on file   Housing Stability:     Unable to Pay for Housing in the Last Year: Not on file    Number of [place] in the Last Year: Not on file    Unstable Housing in the Last Year: Not on file     Family History   Problem Relation Age of Onset    High Blood Pressure Father     Heart Disease Father      No Known Allergies      Review of Systems  Unremarkable    Objective:   Pulse 96   Temp 97.4 °F (36.3 °C) (Temporal)   Ht 5' 4\" (1.626 m)   Wt 211 lb (95.7 kg)   SpO2 98%   BMI 36.22 kg/m²     ORTHO EXAM  Ecchymosis noted over the radial aspect of her left hand to the carpus. There is no point tenderness. No tenderness in the anatomic snuffbox. She has slight laxity of the ulnar collateral ligament with valgus stressing however it is identical to the contralateral thumb and not associated with any pain or tenderness. Wrist cap refill normal sensation. Radiographs and Laboratory Studies:     Diagnostic Imaging Studies:      Outside x-rays reviewed and are unremarkable. No fractures noted. Laboratory Studies:   Lab Results   Component Value Date    WBC 7.0 01/18/2021    HGB 13.3 01/18/2021    HCT 40.4 01/18/2021    MCV 91.8 01/18/2021     01/18/2021     No results found for: Dianna Bucio  No results found for: CRP    Assessment:       Diagnosis Orders   1. Contusion of left hand, initial encounter       Impression: Left hand and wrist contusion. No obvious fracture. No significant human injury. Plan:     She will continue with her thumb spica splint. It may be removed for bathing. Ice and over-the-counter anti-inflammatories.   I would anticipate approximately 2 weeks of immobilization. Follow-up as needed    No orders of the defined types were placed in this encounter. No orders of the defined types were placed in this encounter. No follow-ups on file.       Andrea Calvillo MD

## 2022-03-07 ENCOUNTER — TELEMEDICINE (OUTPATIENT)
Dept: FAMILY MEDICINE CLINIC | Age: 42
End: 2022-03-07
Payer: COMMERCIAL

## 2022-03-07 DIAGNOSIS — G40.909 NONINTRACTABLE EPILEPSY WITHOUT STATUS EPILEPTICUS, UNSPECIFIED EPILEPSY TYPE (HCC): ICD-10-CM

## 2022-03-07 PROCEDURE — 99214 OFFICE O/P EST MOD 30 MIN: CPT | Performed by: FAMILY MEDICINE

## 2022-03-07 RX ORDER — LAMOTRIGINE 200 MG/1
TABLET ORAL
Qty: 180 TABLET | Refills: 3 | Status: SHIPPED | OUTPATIENT
Start: 2022-03-07

## 2022-03-07 RX ORDER — LEVETIRACETAM 500 MG/1
TABLET ORAL
Qty: 180 TABLET | Refills: 3 | Status: SHIPPED | OUTPATIENT
Start: 2022-03-07

## 2022-03-07 SDOH — ECONOMIC STABILITY: FOOD INSECURITY: WITHIN THE PAST 12 MONTHS, THE FOOD YOU BOUGHT JUST DIDN'T LAST AND YOU DIDN'T HAVE MONEY TO GET MORE.: NEVER TRUE

## 2022-03-07 SDOH — ECONOMIC STABILITY: FOOD INSECURITY: WITHIN THE PAST 12 MONTHS, YOU WORRIED THAT YOUR FOOD WOULD RUN OUT BEFORE YOU GOT MONEY TO BUY MORE.: NEVER TRUE

## 2022-03-07 ASSESSMENT — PATIENT HEALTH QUESTIONNAIRE - PHQ9
2. FEELING DOWN, DEPRESSED OR HOPELESS: 0
SUM OF ALL RESPONSES TO PHQ QUESTIONS 1-9: 0
SUM OF ALL RESPONSES TO PHQ9 QUESTIONS 1 & 2: 0
1. LITTLE INTEREST OR PLEASURE IN DOING THINGS: 0
SUM OF ALL RESPONSES TO PHQ QUESTIONS 1-9: 0

## 2022-03-07 ASSESSMENT — SOCIAL DETERMINANTS OF HEALTH (SDOH): HOW HARD IS IT FOR YOU TO PAY FOR THE VERY BASICS LIKE FOOD, HOUSING, MEDICAL CARE, AND HEATING?: NOT HARD AT ALL

## 2022-03-07 NOTE — PROGRESS NOTES
3/7/2022    TELEHEALTH EVALUATION -- Audio/Visual (During ISHGN-80 public health emergency)    HPI:    Mahendra Crook (:  1980) has requested an audio/video evaluation for the following concern(s):      Epilepsy   Patient is on Lamictal and keppra 500 mg BID and stable on the medication. No issues with the medication. Last seizures was 2018. Admits some times gets absence seizures. Reports that seizures are triggered by lack of sleep. Stable at this time. Has not had her levels checked. ROS  CONSTITUTIONAL: The patient denies fevers, chills, sweats and body ache. HEENT: Denies headache, blurry vision, eye pain, tinnitus, vertigo,  sore throat, neck or thyroid masses. RESPIRATORY: Denies cough, sputum, hemoptysis. CARDIAC: Denies chest pain, pressure, palpitations, Denies lower extremity edema. GASTROINTESTINAL: Denies abdominal pain, constipation, diarrhea, bleeding in the stools,   GENITOURINARY: Denies dysuria, hematuria, nocturia or frequency, urinary incontinence. NEUROLOGIC: Denies headaches, dizziness, syncope, weakness. Admits to a history of seizures. MUSCULOSKELETAL: denies changes in range of motion, joint pain, stiffness. ENDOCRINOLOGY: Denies heat or cold intolerance. HEMATOLOGY: Denies easy bleeding or blood transfusion,anemia  DERMATOLOGY: Denies changes in moles or pigmentation changes. PSYCHIATRY: Denies depression, agitation or anxiety. Prior to Visit Medications    Medication Sig Taking?  Authorizing Provider   levETIRAcetam (KEPPRA) 500 MG tablet TAKE 1 TABLET TWICE A DAY Yes Kianna Schwab MD   lamoTRIgine (LAMICTAL) 200 MG tablet TAKE 1 TABLET TWICE A DAY Yes Kianna Schwab MD   Multiple Vitamins-Minerals (MULTIVITAMIN ADULTS PO) Take by mouth Yes Historical Provider, MD       Social History     Tobacco Use    Smoking status: Never Smoker    Smokeless tobacco: Never Used   Substance Use Topics    Alcohol use: No    Drug use: Not on file No Known Allergies,   Past Medical History:   Diagnosis Date    Epilepsy ([address] 2006, 2012    last grand mal 2016   ,   Past Surgical History:   Procedure Laterality Date    WISDOM TOOTH EXTRACTION     ,   Social History     Tobacco Use    Smoking status: Never Smoker    Smokeless tobacco: Never Used   Substance Use Topics    Alcohol use: No    Drug use: Not on file   ,   Family History   Problem Relation Age of Onset    High Blood Pressure Father     Heart Disease Father    ,   Immunization History   Administered Date(s) Administered    COVID-19, Shireen Vicente, Primary or Immunocompromised, PF, 100mcg/0.5mL 02/26/2021, 03/26/2021, 10/29/2021    Influenza Vaccine, unspecified formulation 11/04/2016    Influenza Virus Vaccine 09/16/2017, 09/19/2020, 09/20/2021    Influenza, MDCK Quadv, IM, PF (Flucelvax 2 yrs and older) 11/04/2016, 11/04/2016, 10/13/2018, 10/13/2018    Influenza, Charley Sol, IM, PF (6 mo and older Fluzone, Flulaval, Fluarix, and 3 yrs and older Afluria) 10/11/2015, 10/11/2015, 09/16/2017, 09/16/2017, 11/01/2019, 11/01/2019, 09/17/2020, 09/19/2020, 09/11/2021    Typhoid Vi capsular polysaccharide (Typhim VI) 11/09/2018, 11/09/2018   ,   Health Maintenance   Topic Date Due    Varicella vaccine (1 of 2 - 2-dose childhood series) Never done    DTaP/Tdap/Td vaccine (1 - Tdap) Never done    Cervical cancer screen  Never done    Depression Screen  03/07/2023    Lipid screen  01/18/2026    Flu vaccine  Completed    COVID-19 Vaccine  Completed    Hepatitis C screen  Completed    HIV screen  Completed    Hepatitis A vaccine  Aged Out    Hepatitis B vaccine  Aged Out    Hib vaccine  Aged Out    Meningococcal (ACWY) vaccine  Aged Out    Pneumococcal 0-64 years Vaccine  Aged Out       PHYSICAL EXAMINATION:  [ INSTRUCTIONS:  \"[x]\" Indicates a positive item  \"[]\" Indicates a negative item  -- DELETE ALL ITEMS NOT EXAMINED]  Vital Signs: (As obtained by patient/caregiver or practitioner observation)    Blood pressure-  Heart rate-    Respiratory rate-    Temperature-  Pulse oximetry-     Constitutional: [x] Appears well-developed and well-nourished [x] No apparent distress      [] Abnormal-   Mental status  [x] Alert and awake  [] Oriented to person/place/time []Able to follow commands      Eyes:  EOM    [x]  Normal  [] Abnormal-  Sclera  [x]  Normal  [] Abnormal -         Discharge []  None visible  [] Abnormal -    HENT:   [x] Normocephalic, atraumatic. [] Abnormal   [] Mouth/Throat: Mucous membranes are moist.     External Ears [x] Normal  [] Abnormal-     Neck: [x] No visualized mass     Pulmonary/Chest: [x] Respiratory effort normal.  [] No visualized signs of difficulty breathing or respiratory distress        [] Abnormal-      Musculoskeletal:   [x] Normal gait with no signs of ataxia         [] Normal range of motion of neck        [] Abnormal-       Neurological:        [x] No Facial Asymmetry (Cranial nerve 7 motor function) (limited exam to video visit)          [] No gaze palsy        [] Abnormal-         Skin:        [] No significant exanthematous lesions or discoloration noted on facial skin         [] Abnormal-            Psychiatric:       [x] Normal Affect [] No Hallucinations        [] Abnormal-     Other pertinent observable physical exam findings-     ASSESSMENT/PLAN:  1. Nonintractable epilepsy without status epilepticus, unspecified epilepsy type [facility]  Needs blood work to check levels of keppra and lamictal. Will refill medication. No recent seizures noted. - Lamotrigine Level; Future  - levETIRAcetam (KEPPRA) 500 MG tablet; TAKE 1 TABLET TWICE A DAY  Dispense: 180 tablet; Refill: 3  - lamoTRIgine (LAMICTAL) 200 MG tablet; TAKE 1 TABLET TWICE A DAY  Dispense: 180 tablet; Refill: 3  - Comprehensive Metabolic Panel; Future  - Levetiracetam Level;  Future      Return in about 6 months (around 9/7/2022), or if symptoms worsen or fail to improve, for routine follow tania Matthews, was evaluated through a synchronous (real-time) audio-video encounter. The patient (or guardian if applicable) is aware that this is a billable service, which includes applicable co-pays. This Virtual Visit was conducted with patient's (and/or legal guardian's) consent. The visit was conducted pursuant to the emergency declaration under the [address] [place] [address] and the [employer] and [employer] General Act. Patient identification was verified, and a caregiver was present when appropriate. The patient was located at home in a state where the provider was licensed to provide care. Total time spent on this encounter: 15 minutes    --Bette Deleon MD on 3/8/2022 at 11:57 AM    An electronic signature was used to authenticate this note.

## 2022-03-28 DIAGNOSIS — G40.909 NONINTRACTABLE EPILEPSY WITHOUT STATUS EPILEPTICUS, UNSPECIFIED EPILEPSY TYPE (HCC): ICD-10-CM

## 2022-03-28 LAB
ALBUMIN SERPL-MCNC: 4.7 G/DL (ref 3.5–4.6)
ALP BLD-CCNC: 74 U/L (ref 40–130)
ALT SERPL-CCNC: 13 U/L (ref 0–33)
ANION GAP SERPL CALCULATED.3IONS-SCNC: 13 MEQ/L (ref 9–15)
AST SERPL-CCNC: 16 U/L (ref 0–35)
BILIRUB SERPL-MCNC: 0.4 MG/DL (ref 0.2–0.7)
BUN BLDV-MCNC: 12 MG/DL (ref 6–20)
CALCIUM SERPL-MCNC: 9.8 MG/DL (ref 8.5–9.9)
CHLORIDE BLD-SCNC: 104 MEQ/L (ref 95–107)
CO2: 26 MEQ/L (ref 20–31)
CREAT SERPL-MCNC: 0.59 MG/DL (ref 0.5–0.9)
GFR AFRICAN AMERICAN: >60
GFR NON-AFRICAN AMERICAN: >60
GLOBULIN: 2.9 G/DL (ref 2.3–3.5)
GLUCOSE BLD-MCNC: 91 MG/DL (ref 70–99)
POTASSIUM SERPL-SCNC: 4.1 MEQ/L (ref 3.4–4.9)
SODIUM BLD-SCNC: 143 MEQ/L (ref 135–144)
TOTAL PROTEIN: 7.6 G/DL (ref 6.3–8)

## 2022-03-29 LAB
KEPPRA: 10 UG/ML (ref 10–40)
LAMOTRIGINE LEVEL: 7.3 UG/ML (ref 3–15)

## 2022-05-13 ENCOUNTER — TELEMEDICINE (OUTPATIENT)
Dept: FAMILY MEDICINE CLINIC | Age: 42
End: 2022-05-13
Payer: COMMERCIAL

## 2022-05-13 ENCOUNTER — E-VISIT (OUTPATIENT)
Dept: OTHER | Facility: CLINIC | Age: 42
End: 2022-05-13
Payer: COMMERCIAL

## 2022-05-13 DIAGNOSIS — J02.0 ACUTE STREPTOCOCCAL PHARYNGITIS: Primary | ICD-10-CM

## 2022-05-13 DIAGNOSIS — J06.9 UPPER RESPIRATORY TRACT INFECTION, UNSPECIFIED TYPE: Primary | ICD-10-CM

## 2022-05-13 DIAGNOSIS — J02.9 ACUTE PHARYNGITIS, UNSPECIFIED ETIOLOGY: ICD-10-CM

## 2022-05-13 PROCEDURE — 99213 OFFICE O/P EST LOW 20 MIN: CPT | Performed by: FAMILY MEDICINE

## 2022-05-13 PROCEDURE — 99423 OL DIG E/M SVC 21+ MIN: CPT | Performed by: NURSE PRACTITIONER

## 2022-05-13 RX ORDER — AMOXICILLIN AND CLAVULANATE POTASSIUM 875; 125 MG/1; MG/1
1 TABLET, FILM COATED ORAL 2 TIMES DAILY
Qty: 14 TABLET | Refills: 0 | Status: SHIPPED | OUTPATIENT
Start: 2022-05-13 | End: 2022-05-20

## 2022-05-13 ASSESSMENT — ENCOUNTER SYMPTOMS
EYE PAIN: 0
SINUS PAIN: 0
CHEST TIGHTNESS: 0
BLOOD IN STOOL: 0
DIARRHEA: 0
STRIDOR: 0
NAUSEA: 0
RHINORRHEA: 0
SINUS PRESSURE: 0
CHOKING: 0
COUGH: 1
SORE THROAT: 1

## 2022-05-13 NOTE — PROGRESS NOTES
2022    TELEHEALTH EVALUATION -- Audio/Visual (During WAQKY-52 public health emergency)    HPI:    Josefina Lozada (:  1980) has requested an audio/video evaluation for the following concern(s):    Sore thraot   Cough with production   runnynose   Sore throat x 7 days   covid test negative       Review of Systems   Constitutional: Negative for activity change, appetite change, fatigue and fever. HENT: Positive for sore throat. Negative for dental problem, drooling, rhinorrhea, sinus pressure and sinus pain. Eyes: Negative for pain. Respiratory: Positive for cough. Negative for choking, chest tightness and stridor. Gastrointestinal: Negative for blood in stool, diarrhea and nausea. Endocrine: Negative for heat intolerance and polydipsia. Genitourinary: Negative for dyspareunia, flank pain, hematuria and vaginal bleeding. Musculoskeletal: Negative for gait problem and joint swelling. Allergic/Immunologic: Negative for environmental allergies and food allergies. Neurological: Negative for tremors, speech difficulty and headaches. Psychiatric/Behavioral: Negative for decreased concentration, hallucinations and self-injury. Prior to Visit Medications    Medication Sig Taking?  Authorizing Provider   amoxicillin-clavulanate (AUGMENTIN) 875-125 MG per tablet Take 1 tablet by mouth 2 times daily for 7 days Yes Kev Mcleod MD   levETIRAcetam (KEPPRA) 500 MG tablet TAKE 1 TABLET TWICE A DAY  Elena Hopson MD   lamoTRIgine (LAMICTAL) 200 MG tablet TAKE 1 TABLET TWICE A DAY  Elena Hopson MD   Multiple Vitamins-Minerals (MULTIVITAMIN ADULTS PO) Take by mouth  Historical Provider, MD       Social History     Tobacco Use    Smoking status: Never Smoker    Smokeless tobacco: Never Used   Substance Use Topics    Alcohol use: No    Drug use: Not on file        No Known Allergies,   Past Medical History:   Diagnosis Date    Epilepsy ([facility] ,     last grand mal 2016   ,   Past Surgical History:   Procedure Laterality Date    WISDOM TOOTH EXTRACTION     ,   Social History     Tobacco Use    Smoking status: Never Smoker    Smokeless tobacco: Never Used   Substance Use Topics    Alcohol use: No    Drug use: Not on file   ,   Family History   Problem Relation Age of Onset    High Blood Pressure Father     Heart Disease Father    ,   Immunization History   Administered Date(s) Administered    COVID-19Lluvia American, Primary or Immunocompromised, PF, 100mcg/0.5mL 02/26/2021, 03/26/2021, 10/29/2021    Influenza Vaccine, unspecified formulation 11/04/2016    Influenza Virus Vaccine 09/16/2017, 09/19/2020, 09/20/2021    Influenza, MDCK Quadv, IM, PF (Flucelvax 2 yrs and older) 11/04/2016, 11/04/2016, 10/13/2018, 10/13/2018    Influenza, Hassel Scarce, IM, PF (6 mo and older Fluzone, Flulaval, Fluarix, and 3 yrs and older Afluria) 10/11/2015, 10/11/2015, 09/16/2017, 09/16/2017, 11/01/2019, 11/01/2019, 09/17/2020, 09/19/2020, 09/11/2021    Typhoid Vi capsular polysaccharide (Typhim VI) 11/09/2018, 11/09/2018   ,   Health Maintenance   Topic Date Due    Varicella vaccine (1 of 2 - 2-dose childhood series) Never done    DTaP/Tdap/Td vaccine (1 - Tdap) Never done    Cervical cancer screen  Never done    Depression Screen  03/07/2023    Lipids  01/18/2026    Flu vaccine  Completed    COVID-19 Vaccine  Completed    Hepatitis C screen  Completed    HIV screen  Completed    Hepatitis A vaccine  Aged Out    Hepatitis B vaccine  Aged Out    Hib vaccine  Aged Out    Meningococcal (ACWY) vaccine  Aged Out    Pneumococcal 0-64 years Vaccine  Aged Out       PHYSICAL EXAMINATION:  [ INSTRUCTIONS:  \"[x]\" Indicates a positive item  \"[]\" Indicates a negative item  -- DELETE ALL ITEMS NOT EXAMINED]  Vital Signs: (As obtained by patient/caregiver or practitioner observation)    Blood pressure-  Heart rate-    Respiratory rate-    Temperature-  Pulse oximetry-     Constitutional: [x] Appears well-developed and well-nourished [x] No apparent distress      [] Abnormal-   Mental status  [x] Alert and awake  [] Oriented to person/place/time []Able to follow commands      Eyes:  EOM    [x]  Normal  [] Abnormal-  Sclera  [x]  Normal  [] Abnormal -         Discharge []  None visible  [] Abnormal -    HENT:   [x] Normocephalic, atraumatic. [] Abnormal   [] Mouth/Throat: Mucous membranes are moist.     External Ears [x] Normal  [] Abnormal-     Neck: [x] No visualized mass     Pulmonary/Chest: [x] Respiratory effort normal.  [] No visualized signs of difficulty breathing or respiratory distress        [] Abnormal-      Musculoskeletal:   [x] Normal gait with no signs of ataxia         [] Normal range of motion of neck        [] Abnormal-       Neurological:        [x] No Facial Asymmetry (Cranial nerve 7 motor function) (limited exam to video visit)          [] No gaze palsy        [] Abnormal-         Skin:        [] No significant exanthematous lesions or discoloration noted on facial skin         [] Abnormal-            Psychiatric:       [x] Normal Affect [] No Hallucinations        [] Abnormal-     Other pertinent observable physical exam findings-     ASSESSMENT/PLAN:    2. Acute pharyngitis, unspecified etiology    - amoxicillin-clavulanate (AUGMENTIN) 875-125 MG per tablet; Take 1 tablet by mouth 2 times daily for 7 days  Dispense: 14 tablet; Refill: 0        Return if symptoms worsen or fail to improve. Morales Modi, was evaluated through a synchronous (real-time) audio-video encounter. The patient (or guardian if applicable) is aware that this is a billable service, which includes applicable co-pays. This Virtual Visit was conducted with patient's (and/or legal guardian's) consent.  The visit was conducted pursuant to the emergency declaration under the [facility] waiver authority and the [employer] Appropriations Act. Patient identification was verified, and a caregiver was present when appropriate. The patient was located at home in a state where the provider was licensed to provide care. Total time spent on this encounter: 15 minutes    --Heriberto Mendoza MD on 5/13/2022 at 11:43 AM    An electronic signature was used to authenticate this note.

## 2022-07-11 DIAGNOSIS — Z29.8 NEED FOR MALARIA PROPHYLAXIS: Primary | ICD-10-CM

## 2022-07-11 RX ORDER — MEFLOQUINE HYDROCHLORIDE 250 MG/1
TABLET ORAL
Qty: 8 TABLET | Refills: 0 | Status: SHIPPED | OUTPATIENT
Start: 2022-07-11

## 2022-08-01 ENCOUNTER — OFFICE VISIT (OUTPATIENT)
Dept: FAMILY MEDICINE CLINIC | Age: 42
End: 2022-08-01
Payer: COMMERCIAL

## 2022-08-01 VITALS
HEIGHT: 64 IN | DIASTOLIC BLOOD PRESSURE: 82 MMHG | TEMPERATURE: 97.8 F | OXYGEN SATURATION: 98 % | WEIGHT: 225.2 LBS | BODY MASS INDEX: 38.45 KG/M2 | HEART RATE: 88 BPM | SYSTOLIC BLOOD PRESSURE: 126 MMHG

## 2022-08-01 DIAGNOSIS — Z12.31 ENCOUNTER FOR SCREENING MAMMOGRAM FOR MALIGNANT NEOPLASM OF BREAST: ICD-10-CM

## 2022-08-01 DIAGNOSIS — F41.9 ANXIETY: ICD-10-CM

## 2022-08-01 DIAGNOSIS — Z00.00 PREVENTATIVE HEALTH CARE: Primary | ICD-10-CM

## 2022-08-01 DIAGNOSIS — Z00.00 PREVENTATIVE HEALTH CARE: ICD-10-CM

## 2022-08-01 LAB
CHOLESTEROL, FASTING: 181 MG/DL (ref 0–199)
GLUCOSE FASTING: 88 MG/DL (ref 70–99)
HDLC SERPL-MCNC: 49 MG/DL (ref 40–59)
LDL CHOLESTEROL CALCULATED: 120 MG/DL (ref 0–129)
TRIGLYCERIDE, FASTING: 62 MG/DL (ref 0–150)

## 2022-08-01 PROCEDURE — 99396 PREV VISIT EST AGE 40-64: CPT | Performed by: FAMILY MEDICINE

## 2022-08-01 RX ORDER — PROPRANOLOL HYDROCHLORIDE 10 MG/1
10 TABLET ORAL 2 TIMES DAILY PRN
Qty: 60 TABLET | Refills: 1 | Status: SHIPPED | OUTPATIENT
Start: 2022-08-01 | End: 2022-08-23

## 2022-08-01 NOTE — PROGRESS NOTES
Chief Complaint   Patient presents with    Annual Exam     Pt needs a physical because her insurance is renewed. Immunizations     Pt wondering for a yellow fever vaccine. HPI: [facility] 37 y.o. female presenting for       Patient is here for an annual examination. Carissa reports that her father recently had surgery for an aortic aneurysm (also has a history of heart disease). Due to that, it was recommended that patient be evaluated for cardiovascular disease. Patient father was recently had a surgery for an aortic aneurynsm. The surgeons recommeneded that she gets evaluated for cardiovasuclar disease. Carissa exercises and tries to eliminate salt her diet. Intermittently snores at night. Patient denies any sad feelings or depressed feelings. Patient is interested in losing weight. Current Outpatient Medications   Medication Sig Dispense Refill    Phenylephrine-Acetaminophen 5-325 MG TABS       propranolol (INDERAL) 10 MG tablet Take 1 tablet by mouth 2 times daily as needed (anxiety) 60 tablet 1    mefloquine (LARIAM) 250 MG tablet 250 mg weekly starting 3 weeks before arrival in endemic area, continuing weekly during travel and for 4 weeks after leaving endemic area. 8 tablet 0    levETIRAcetam (KEPPRA) 500 MG tablet TAKE 1 TABLET TWICE A  tablet 3    lamoTRIgine (LAMICTAL) 200 MG tablet TAKE 1 TABLET TWICE A  tablet 3    Multiple Vitamins-Minerals (MULTIVITAMIN ADULTS PO) Take by mouth       No current facility-administered medications for this visit. ROS  CONSTITUTIONAL: The patient denies fevers, chills, sweats and body ache. HEENT:, blurry vision, eye pain, tinnitus, vertigo,  sore throat, neck or thyroid masses. Right ear pain. Admits to a headaches. RESPIRATORY: Denies cough, sputum, hemoptysis. CARDIAC: Denies chest pain, pressure, palpitations, Denies lower extremity edema.   GASTROINTESTINAL: Denies abdominal pain, constipation, diarrhea, bleeding in the stools,   GENITOURINARY: Denies dysuria, hematuria, nocturia or frequency, urinary incontinence. NEUROLOGIC: Denies headaches, dizziness, syncope, weakness  MUSCULOSKELETAL: denies changes in range of motion, joint pain, stiffness. ENDOCRINOLOGY: Denies heat or cold intolerance. HEMATOLOGY: Denies easy bleeding or blood transfusion,anemia  DERMATOLOGY: Denies changes in moles or pigmentation changes. PSYCHIATRY: Denies depression, agitation or anxiety.     Past Medical History:   Diagnosis Date    Epilepsy ([address] 2006, 2012    last grand mal 2016        Past Surgical History:   Procedure Laterality Date    WISDOM TOOTH EXTRACTION          Family History   Problem Relation Age of Onset    High Blood Pressure Father     Heart Disease Father         Social History     Socioeconomic History    Marital status: Single     Spouse name: Not on file    Number of children: Not on file    Years of education: Not on file    Highest education level: Not on file   Occupational History    Not on file   Tobacco Use    Smoking status: Never    Smokeless tobacco: Never   Substance and Sexual Activity    Alcohol use: No    Drug use: Not on file    Sexual activity: Not on file   Other Topics Concern    Not on file   Social History Narrative    Not on file     Social Determinants of Health     Financial Resource Strain: Low Risk     Difficulty of Paying Living Expenses: Not hard at all   Food Insecurity: No Food Insecurity    Worried About Running Out of Food in the Last Year: Never true    Ran Out of Food in the Last Year: Never true   Transportation Needs: Not on file   Physical Activity: Not on file   Stress: Not on file   Social Connections: Not on file   Intimate Partner Violence: Not on file   Housing Stability: Not on file        /82   Pulse 88 Comment: keeps fluctuating between 88 to 111  Temp 97.8 °F (36.6 °C) (Temporal)   Ht 5' 4\" (1.626 m)   Wt 225 lb 3.2 oz (102.2 kg)   SpO2 98%   BMI 38.66 kg/m² Physical Exam:    General appearance - alert, well appearing, and in no distress  Mental Status - alert, oriented to person, place, and time  Eyes - pupils equal and reactive, extraocular eye movements intact   Ears - bilateral TM's and external ear canals normal   Nose - normal and patent, no erythema, discharge or polyps   Sinuses - Normal paranasal sinuses without tenderness   Throat - mucous membranes moist, pharynx normal without lesions   Neck - supple, no significant adenopathy   Thyroid - thyroid is normal in size without nodules or tenderness    Chest - clear to auscultation, no wheezes, rales or rhonchi, symmetric air entry   Heart - slightly tachycardic but improved.   No irregular  rhythm, normal S1, S2, no murmurs, rubs, clicks or gallops  Abdomen - soft, nontender, nondistended, no masses or organomegaly   Back exam - full range of motion, no tenderness, palpable spasm or pain on motion   Neurological - alert, oriented, normal speech, no focal findings or movement disorder noted   Musculoskeletal - no joint tenderness, deformity or swelling   Extremities - peripheral pulses normal, no pedal edema, no clubbing or cyanosis   Skin - normal coloration and turgor, no rashes, no suspicious skin lesions noted    Labs   TSH   Date Value Ref Range Status   01/18/2021 2.580 0.440 - 3.860 uIU/mL Final     TSH   Date Value Ref Range Status   01/06/2018 2.390 0.270 - 4.200 uIU/mL Final     Lab Results   Component Value Date     03/28/2022    K 4.1 03/28/2022     03/28/2022    CO2 26 03/28/2022    BUN 12 03/28/2022    CREATININE 0.59 03/28/2022    GLUCOSE 91 03/28/2022    CALCIUM 9.8 03/28/2022    PROT 7.6 03/28/2022    LABALBU 4.7 (H) 03/28/2022    BILITOT 0.4 03/28/2022    ALKPHOS 74 03/28/2022    AST 16 03/28/2022    ALT 13 03/28/2022    LABGLOM >60.0 03/28/2022    GFRAA >60.0 03/28/2022    GLOB 2.9 03/28/2022       Lab Results   Component Value Date    WBC 7.0 01/18/2021    HGB 13.3 01/18/2021 HCT 40.4 01/18/2021    MCV 91.8 01/18/2021     01/18/2021     No results found for: LABA1C  No results found for: EAG      A/P: Nory Cowart 37 y.o. female presenting for       1. Encounter for screening mammogram for malignant neoplasm of breast    - EMILIO DIGITAL SCREEN W OR WO CAD BILATERAL; Future    2. Preventative health care    - Lipid, Fasting; Future  - Glucose, Fasting;  Future

## 2022-08-19 ENCOUNTER — HOSPITAL ENCOUNTER (OUTPATIENT)
Dept: WOMENS IMAGING | Age: 42
Discharge: HOME | End: 2022-08-21
Payer: COMMERCIAL

## 2022-08-19 DIAGNOSIS — Z12.31 ENCOUNTER FOR SCREENING MAMMOGRAM FOR MALIGNANT NEOPLASM OF BREAST: ICD-10-CM

## 2022-08-19 PROCEDURE — 77063 BREAST TOMOSYNTHESIS BI: CPT

## 2022-08-23 DIAGNOSIS — F41.9 ANXIETY: ICD-10-CM

## 2022-08-23 RX ORDER — PROPRANOLOL HYDROCHLORIDE 10 MG/1
10 TABLET ORAL 2 TIMES DAILY PRN
Qty: 60 TABLET | Refills: 5 | Status: SHIPPED | OUTPATIENT
Start: 2022-08-23

## 2022-08-23 NOTE — TELEPHONE ENCOUNTER
Future Appointments    Encounter Information    Provider Department Appt Notes   9/12/2022 Mary Miller MD [facility] 6 Week follow up     Past Visits    Date Provider Specialty Visit Type Primary Dx   08/01/2022 Mary Miller MD Family Medicine Office Visit Preventative health care

## 2022-09-12 ENCOUNTER — OFFICE VISIT (OUTPATIENT)
Dept: FAMILY MEDICINE CLINIC | Age: 42
End: 2022-09-12
Payer: COMMERCIAL

## 2022-09-12 VITALS
TEMPERATURE: 97.9 F | OXYGEN SATURATION: 93 % | BODY MASS INDEX: 39.44 KG/M2 | DIASTOLIC BLOOD PRESSURE: 78 MMHG | HEIGHT: 64 IN | WEIGHT: 231 LBS | SYSTOLIC BLOOD PRESSURE: 118 MMHG | HEART RATE: 81 BPM

## 2022-09-12 DIAGNOSIS — Z23 NEED FOR INFLUENZA VACCINATION: ICD-10-CM

## 2022-09-12 DIAGNOSIS — F41.9 ANXIETY: Primary | ICD-10-CM

## 2022-09-12 PROCEDURE — 99213 OFFICE O/P EST LOW 20 MIN: CPT | Performed by: FAMILY MEDICINE

## 2022-09-12 PROCEDURE — 90674 CCIIV4 VAC NO PRSV 0.5 ML IM: CPT | Performed by: FAMILY MEDICINE

## 2022-09-12 PROCEDURE — 90471 IMMUNIZATION ADMIN: CPT | Performed by: FAMILY MEDICINE

## 2022-09-12 NOTE — PROGRESS NOTES
Vaccine Information Sheet, \"Influenza - Inactivated\"  given to Meagan Donnelly, or parent/legal guardian of  Meagan Donnelly and verbalized understanding. Patient responses:    Have you ever had a reaction to a flu vaccine? No  Are you able to eat eggs without adverse effects? Yes  Do you have any current illness? No  Have you ever had [place] Syndrome? No    Flu vaccine given per order. Please see immunization tab.

## 2022-09-12 NOTE — PROGRESS NOTES
Chief Complaint   Patient presents with    Follow-up     No issues/concerns        HPI: Johanny Lemon 37 y.o. female presenting for     Patient is here for anxiety follow up   Doing well with the propranolol   Uses it about 2 times a week as needed for the anxiety   Does help   No issues or concerns. Current Outpatient Medications   Medication Sig Dispense Refill    propranolol (INDERAL) 10 MG tablet TAKE 1 TABLET BY MOUTH 2 TIMES DAILY AS NEEDED (ANXIETY). 60 tablet 5    mefloquine (LARIAM) 250 MG tablet 250 mg weekly starting 3 weeks before arrival in endemic area, continuing weekly during travel and for 4 weeks after leaving endemic area. 8 tablet 0    levETIRAcetam (KEPPRA) 500 MG tablet TAKE 1 TABLET TWICE A  tablet 3    lamoTRIgine (LAMICTAL) 200 MG tablet TAKE 1 TABLET TWICE A  tablet 3    Phenylephrine-Acetaminophen 5-325 MG TABS        No current facility-administered medications for this visit. ROS  CONSTITUTIONAL: The patient denies fevers, chills, sweats and body ache. HEENT:, blurry vision, eye pain, tinnitus, vertigo,  sore throat, neck or thyroid masses. Right ear pain. Admits to a headaches. RESPIRATORY: Denies cough, sputum, hemoptysis. CARDIAC: Denies chest pain, pressure, palpitations, Denies lower extremity edema. GASTROINTESTINAL: Denies abdominal pain, constipation, diarrhea, bleeding in the stools,   GENITOURINARY: Denies dysuria, hematuria, nocturia or frequency, urinary incontinence. NEUROLOGIC: Denies headaches, dizziness, syncope, weakness  MUSCULOSKELETAL: denies changes in range of motion, joint pain, stiffness. ENDOCRINOLOGY: Denies heat or cold intolerance. HEMATOLOGY: Denies easy bleeding or blood transfusion,anemia  DERMATOLOGY: Denies changes in moles or pigmentation changes. PSYCHIATRY: anxiety has improved.      Past Medical History:   Diagnosis Date    Anxiety     Epilepsy ([address] 2006, 2012    last grand mal 2016        Past Surgical History:   Procedure Laterality Date    WISDOM TOOTH EXTRACTION          Family History   Problem Relation Age of Onset    High Blood Pressure Father     Heart Disease Father     Breast Cancer Paternal Aunt     Breast Cancer Maternal Uncle         Social History     Socioeconomic History    Marital status: Single     Spouse name: Not on file    Number of children: Not on file    Years of education: Not on file    Highest education level: Not on file   Occupational History    Not on file   Tobacco Use    Smoking status: Never    Smokeless tobacco: Never   Substance and Sexual Activity    Alcohol use: No    Drug use: Not on file    Sexual activity: Not on file   Other Topics Concern    Not on file   Social History Narrative    Not on file     Social Determinants of Health     Financial Resource Strain: Low Risk     Difficulty of Paying Living Expenses: Not hard at all   Food Insecurity: No Food Insecurity    Worried About Running Out of Food in the Last Year: Never true    Ran Out of Food in the Last Year: Never true   Transportation Needs: Not on file   Physical Activity: Not on file   Stress: Not on file   Social Connections: Not on file   Intimate Partner Violence: Not on file   Housing Stability: Not on file        /78   Pulse 81   Temp 97.9 °F (36.6 °C) (Temporal)   Ht 5' 4\" (1.626 m)   Wt 231 lb (104.8 kg)   SpO2 93%   BMI 39.65 kg/m²        Physical Exam:    General appearance - alert, well appearing, and in no distress  Mental Status - alert, oriented to person, place, and time  Eyes - pupils equal and reactive, extraocular eye movements intact   Ears - bilateral TM's and external ear canals normal   Nose - normal and patent, no erythema, discharge or polyps   Sinuses - Normal paranasal sinuses without tenderness   Throat - mucous membranes moist, pharynx normal without lesions   Neck - supple, no significant adenopathy   Thyroid - thyroid is normal in size without nodules or tenderness    Chest - clear to auscultation, no wheezes, rales or rhonchi, symmetric air entry   Heart - slightly tachycardic but improved. No irregular  rhythm, normal S1, S2, no murmurs, rubs, clicks or gallops  Abdomen - soft, nontender, nondistended, no masses or organomegaly   Back exam - full range of motion, no tenderness, palpable spasm or pain on motion   Neurological - alert, oriented, normal speech, no focal findings or movement disorder noted   Musculoskeletal - no joint tenderness, deformity or swelling   Extremities - peripheral pulses normal, no pedal edema, no clubbing or cyanosis   Skin - normal coloration and turgor, no rashes, no suspicious skin lesions noted    Labs   TSH   Date Value Ref Range Status   01/18/2021 2.580 0.440 - 3.860 uIU/mL Final     TSH   Date Value Ref Range Status   01/06/2018 2.390 0.270 - 4.200 uIU/mL Final     Lab Results   Component Value Date     03/28/2022    K 4.1 03/28/2022     03/28/2022    CO2 26 03/28/2022    BUN 12 03/28/2022    CREATININE 0.59 03/28/2022    GLUCOSE 91 03/28/2022    CALCIUM 9.8 03/28/2022    PROT 7.6 03/28/2022    LABALBU 4.7 (H) 03/28/2022    BILITOT 0.4 03/28/2022    ALKPHOS 74 03/28/2022    AST 16 03/28/2022    ALT 13 03/28/2022    LABGLOM >60.0 03/28/2022    GFRAA >60.0 03/28/2022    GLOB 2.9 03/28/2022       Lab Results   Component Value Date    WBC 7.0 01/18/2021    HGB 13.3 01/18/2021    HCT 40.4 01/18/2021    MCV 91.8 01/18/2021     01/18/2021     No results found for: LABA1C  No results found for: EAG      A/P: Marjorie Gutierrez 37 y.o. female presenting for     1. Anxiety  Doing well on propranolol   No refills needed.      2. Need for influenza vaccination    - Influenza, FLUCELVAX, (age 10 mo+), IM, Preservative Free, 0.5 mL

## 2022-10-31 NOTE — ED NOTES
Patient report nausea, writer will present to provider     Lee Ann Whalen RN  02/07/22 [id number] Supportive social network of family or friends

## 2023-02-17 DIAGNOSIS — G40.909 NONINTRACTABLE EPILEPSY WITHOUT STATUS EPILEPTICUS, UNSPECIFIED EPILEPSY TYPE (HCC): ICD-10-CM

## 2023-02-17 RX ORDER — LAMOTRIGINE 200 MG/1
TABLET ORAL
Qty: 180 TABLET | Refills: 3 | Status: SHIPPED | OUTPATIENT
Start: 2023-02-17

## 2023-02-17 RX ORDER — LEVETIRACETAM 500 MG/1
TABLET ORAL
Qty: 180 TABLET | Refills: 3 | Status: SHIPPED | OUTPATIENT
Start: 2023-02-17

## 2023-03-13 RX ORDER — AMOXICILLIN AND CLAVULANATE POTASSIUM 875; 125 MG/1; MG/1
1 TABLET, FILM COATED ORAL 2 TIMES DAILY
Qty: 20 TABLET | Refills: 0 | Status: SHIPPED | OUTPATIENT
Start: 2023-03-13 | End: 2023-03-23

## 2023-03-13 ASSESSMENT — LIFESTYLE VARIABLES: SMOKING_STATUS: NO, I HAVE NEVER SMOKED

## 2023-03-13 NOTE — PROGRESS NOTES
Shmuel Ramirez (1980) initiated an asynchronous digital communication through [address] [place] [address]. HPI: per patient questionnaire     Exam: not applicable    Diagnoses and all orders for this visit:  Diagnoses and all orders for this visit:    Upper respiratory tract infection, unspecified type  Change toothbrush  Supportive care measures provided  F/u with pcp as needed  Time: EV3 - 21 or more minutes were spent on the digital evaluation and management of this patient. 23 min     Chloé Martins, APRN - CNP

## 2023-03-15 ENCOUNTER — TELEPHONE (OUTPATIENT)
Dept: FAMILY MEDICINE CLINIC | Age: 43
End: 2023-03-15

## 2023-03-15 NOTE — TELEPHONE ENCOUNTER
----- Message from Kevin Lua sent at 3/13/2023  9:16 AM EDT -----  Subject: Message to Provider    QUESTIONS  Information for Provider? Patient not feeling well and would like to   change her in-person appointment to a virtual visit. Patient is scheduled   to be seen today 3/13/23 at 4:15pm by Dr. Radha Oh.  ---------------------------------------------------------------------------  --------------  [id number]  [phone]; OK to leave message on voicemail,OK to respond with electronic   message via [employer] portal (only for patients who have registered [employer]   account)  ---------------------------------------------------------------------------  --------------  SCRIPT ANSWERS  Relationship to Patient?  Self

## 2023-05-18 DIAGNOSIS — L60.9 FINGERNAIL ABNORMALITIES: Primary | ICD-10-CM

## 2023-07-27 ENCOUNTER — TELEPHONE (OUTPATIENT)
Dept: FAMILY MEDICINE CLINIC | Age: 43
End: 2023-07-27

## 2023-07-27 DIAGNOSIS — Z12.31 ENCOUNTER FOR SCREENING MAMMOGRAM FOR MALIGNANT NEOPLASM OF BREAST: Primary | ICD-10-CM

## 2023-08-29 ENCOUNTER — HOSPITAL ENCOUNTER (OUTPATIENT)
Dept: WOMENS IMAGING | Age: 43
Discharge: HOME | End: 2023-08-31
Attending: FAMILY MEDICINE
Payer: COMMERCIAL

## 2023-08-29 VITALS — HEIGHT: 64 IN | WEIGHT: 228 LBS | BODY MASS INDEX: 38.93 KG/M2

## 2023-08-29 DIAGNOSIS — Z12.31 ENCOUNTER FOR SCREENING MAMMOGRAM FOR MALIGNANT NEOPLASM OF BREAST: ICD-10-CM

## 2023-08-29 PROCEDURE — 77063 BREAST TOMOSYNTHESIS BI: CPT

## 2023-08-30 DIAGNOSIS — R92.8 ABNORMAL MAMMOGRAM: Primary | ICD-10-CM

## 2023-08-30 DIAGNOSIS — R92.8 ABNORMAL MAMMOGRAM OF RIGHT BREAST: Primary | ICD-10-CM

## 2023-08-30 NOTE — RESULT ENCOUNTER NOTE
Please call and let patient know that she did have an abnormal mammogram.  They need to get additional imaging of the right breast.  I have placed the order. They will call to schedule her. If she has further questions I am happy to talk with her if she would like to set up an appointment.   Thanks

## 2023-09-05 ENCOUNTER — HOSPITAL ENCOUNTER (OUTPATIENT)
Dept: WOMENS IMAGING | Age: 43
Discharge: HOME | End: 2023-09-07
Payer: COMMERCIAL

## 2023-09-05 ENCOUNTER — HOSPITAL ENCOUNTER (OUTPATIENT)
Dept: ULTRASOUND IMAGING | Age: 43
Discharge: HOME | End: 2023-09-07
Payer: COMMERCIAL

## 2023-09-05 DIAGNOSIS — R92.8 ABNORMAL MAMMOGRAM OF RIGHT BREAST: ICD-10-CM

## 2023-09-05 DIAGNOSIS — R92.8 ABNORMAL MAMMOGRAM: ICD-10-CM

## 2023-09-05 PROCEDURE — G0279 TOMOSYNTHESIS, MAMMO: HCPCS

## 2023-09-05 PROCEDURE — 76642 ULTRASOUND BREAST LIMITED: CPT

## 2023-09-06 ENCOUNTER — TELEPHONE (OUTPATIENT)
Dept: FAMILY MEDICINE CLINIC | Age: 43
End: 2023-09-06

## 2023-09-06 DIAGNOSIS — R92.8 ABNORMAL MAMMOGRAM OF RIGHT BREAST: Primary | ICD-10-CM

## 2023-09-06 NOTE — TELEPHONE ENCOUNTER
----- Message from Toni Suh MA sent at 9/6/2023  8:37 AM EDT -----    ----- Message -----  From: Dimitri Morton Incoming Radiant Results From [email or web address]  Sent: 9/5/2023  10:54 AM EDT  To: Olya Ramos MD

## 2023-09-06 NOTE — TELEPHONE ENCOUNTER
Called to let pt know that mammogram was abnormal and biopsy is recommended. Will place referral for Dr. Noe Briones. No answer. LVM.

## 2023-09-08 DIAGNOSIS — R92.8 ABNORMAL MAMMOGRAM: Primary | ICD-10-CM

## 2023-09-11 ENCOUNTER — PATIENT MESSAGE (OUTPATIENT)
Dept: FAMILY MEDICINE CLINIC | Age: 43
End: 2023-09-11

## 2023-09-11 DIAGNOSIS — K08.89 PAIN, DENTAL: Primary | ICD-10-CM

## 2023-09-11 RX ORDER — AMOXICILLIN AND CLAVULANATE POTASSIUM 875; 125 MG/1; MG/1
1 TABLET, FILM COATED ORAL 2 TIMES DAILY
Qty: 20 TABLET | Refills: 0 | Status: SHIPPED | OUTPATIENT
Start: 2023-09-11 | End: 2023-09-21

## 2023-09-12 ENCOUNTER — HOSPITAL ENCOUNTER (OUTPATIENT)
Dept: ULTRASOUND IMAGING | Age: 43
Discharge: HOME | End: 2023-09-14
Attending: SURGERY
Payer: COMMERCIAL

## 2023-09-12 ENCOUNTER — HOSPITAL ENCOUNTER (OUTPATIENT)
Dept: WOMENS IMAGING | Age: 43
Discharge: HOME | End: 2023-09-14
Payer: COMMERCIAL

## 2023-09-12 ENCOUNTER — HOSPITAL ENCOUNTER (OUTPATIENT)
Dept: ULTRASOUND IMAGING | Age: 43
Discharge: HOME | End: 2023-09-14
Payer: COMMERCIAL

## 2023-09-12 VITALS — DIASTOLIC BLOOD PRESSURE: 81 MMHG | HEART RATE: 76 BPM | SYSTOLIC BLOOD PRESSURE: 116 MMHG | RESPIRATION RATE: 20 BRPM

## 2023-09-12 DIAGNOSIS — R92.8 ABNORMAL MAMMOGRAM: ICD-10-CM

## 2023-09-12 DIAGNOSIS — R92.8 ABNORMAL MAMMOGRAM OF RIGHT BREAST: ICD-10-CM

## 2023-09-12 DIAGNOSIS — R92.8 ABNORMAL MAMMOGRAM: Primary | ICD-10-CM

## 2023-09-12 PROCEDURE — 77065 DX MAMMO INCL CAD UNI: CPT

## 2023-09-12 PROCEDURE — 2580000003 HC RX 258: Performed by: RADIOLOGY

## 2023-09-12 PROCEDURE — 2709999900 US BREAST BIOPSY W LOC DEVICE EACH ADDL LESION RIGHT

## 2023-09-12 PROCEDURE — 19083 BX BREAST 1ST LESION US IMAG: CPT

## 2023-09-12 PROCEDURE — 2709999900 MAM STEREO BREAST BX W LOC DEVICE 1ST LESION RIGHT

## 2023-09-12 PROCEDURE — 76098 X-RAY EXAM SURGICAL SPECIMEN: CPT

## 2023-09-12 PROCEDURE — 19081 BX BREAST 1ST LESION STRTCTC: CPT

## 2023-09-12 PROCEDURE — 88305 TISSUE EXAM BY PATHOLOGIST: CPT

## 2023-09-12 PROCEDURE — 2709999900 US BREAST BIOPSY W LOC DEVICE 1ST LESION RIGHT

## 2023-09-12 PROCEDURE — A4217 STERILE WATER/SALINE, 500 ML: HCPCS | Performed by: RADIOLOGY

## 2023-09-12 PROCEDURE — 19084 BX BREAST ADD LESION US IMAG: CPT

## 2023-09-12 PROCEDURE — 2500000003 HC RX 250 WO HCPCS: Performed by: RADIOLOGY

## 2023-09-12 RX ORDER — MAGNESIUM HYDROXIDE 1200 MG/15ML
LIQUID ORAL CONTINUOUS PRN
Status: COMPLETED | OUTPATIENT
Start: 2023-09-12 | End: 2023-09-12

## 2023-09-12 RX ORDER — MAGNESIUM HYDROXIDE 1200 MG/15ML
250 LIQUID ORAL CONTINUOUS
Status: DISCONTINUED | OUTPATIENT
Start: 2023-09-12 | End: 2023-09-15 | Stop reason: HOSPADM

## 2023-09-12 RX ORDER — LIDOCAINE HYDROCHLORIDE AND EPINEPHRINE 10; 10 MG/ML; UG/ML
20 INJECTION, SOLUTION INFILTRATION; PERINEURAL ONCE
Status: COMPLETED | OUTPATIENT
Start: 2023-09-12 | End: 2023-09-12

## 2023-09-12 RX ORDER — LIDOCAINE HYDROCHLORIDE 20 MG/ML
20 INJECTION, SOLUTION INFILTRATION; PERINEURAL ONCE
Status: COMPLETED | OUTPATIENT
Start: 2023-09-12 | End: 2023-09-12

## 2023-09-12 RX ADMIN — LIDOCAINE HYDROCHLORIDE 7 ML: 20 INJECTION, SOLUTION INFILTRATION; PERINEURAL at 09:57

## 2023-09-12 RX ADMIN — LIDOCAINE HYDROCHLORIDE,EPINEPHRINE BITARTRATE 10 ML: 10; .01 INJECTION, SOLUTION INFILTRATION; PERINEURAL at 09:58

## 2023-09-12 RX ADMIN — SODIUM CHLORIDE 250 ML: 900 IRRIGANT IRRIGATION at 00:36

## 2023-09-12 ASSESSMENT — PAIN SCALES - GENERAL
PAINLEVEL_OUTOF10: 0
PAINLEVEL_OUTOF10: 0

## 2023-09-12 NOTE — DISCHARGE INSTRUCTIONS
Post Procedure Instructions for Breast Biopsies    You have had a breast biopsy of the right Breast  x 3 [facility], which was ordered by Dr. Ava Hale. [place]    Activity  You may return to work or other activities the day of your biopsy, providing these activites do not require any heavy lifting or strenuous activity. We do recommend that you take the rest of the day following your biopsy just to rest.    Diet  You may resume your normal diet    Medications  1. Continue taking your normal medications. 2. You may take a mild pain reliever, as needed, such as Tylenol (Acetaminophen), Advil (Ibuprofen) or Motrin    Dressing  1. May remove ace wrap in the morning and take a shower. Pat the dressing dry. Then put on snug fitting bra. 2. Keep the ice pack on for 15 minutes at least 2 times today  3. You may shower and pat the dressing dry tomorrow. On Thursday you may remove the dressing. The biopsy site should have started to heal at this point. At your discretion you can put a band-aid on it. Other Instructions  1. You may have some bruising following the biopsy, that is normal because of the compression and it will heal and go away  2. For severyal days or even a couple of weeks, you may feel mild tenderness, \"twinges\", or even a small bump at the biopsy site. This is normal. Applying moist heat to the area may bring relief. This will disappear with time    If you develop any of the following symptoms, please contact your referring physician. 1. Active bleeding-hold compression to the site. If it does not stop call the radiology department  2. If you develop redness or heat at the biopsy site or a fever 5-7 days following your biopsy this could be a sign of an early infection    Physician performing exam: Dr. Maribeth Rodriguez    Please keep follow up appointment with Dr. Ava Hale.  Shakeel Gutierrez on Wed, Sept 27 at 26 at the [address] [place] [address]   [phone]    [facility]

## 2023-09-12 NOTE — SEDATION DOCUMENTATION
1592 Patient arrived to [facility] with steady gait.  8367 Reviewed procedure with patient and patient verbalizes understanding. VS taken. Consent signed. Reviewed history, medications, and allergies. Assisted patient into mamm room and into stereo chair. Tech taking films. 2508 Dr. Rajani Whitney arrived to [facility] talking with patient and looking at films. Area of concern located and cleaned with chloraprep x 3. He then injected lido 2% into site to numb area  and then injected lido 1% with epi into site deeper into tissue. Then he made small incision using scalpel. Eviva [id number] lot [id number] exp 08- used during procedure. He then used Eviva 0913-20 lot [id number]  exp 06-,  he took samples of tissue out and onto grid marked  right breast A. Film  of specimen taken, and then he took samples of tissue out two more times. Time out at 901. Film of specimen taken. He then inserted [facility] Eviva 2S-13 lot [id number] exp 02-. Film taken. Compression held to site for 5 minutes and then steri strips and gauze over site. Patient assisted out of stereo chair. 3355 Patient assisted into ultrasound room and onto ultrasound cart. Steady gait. Tech scanning patient. 4776 Dr. Rajani Whitney arrived into ultrasound room and looking at scans. Talking with patient and scanning. Will now do two bxs instead of one. Dr. Rajani Whitney then cleaned areas with chloraprep, draped sterile towels around areas, and sterile probe cover over probe. First site at 1000, he injected lido 2% into site to numb followed by lido 1% with epi. He made smalll incision using scalpel. Using Achieve Biopsy Kit 14g x 11cm with introducer 13.5g x 6cm lot [id number] exp 04- he took 3 samples of tissue out and put into formalin marked right breast B at 0951. Patient tolerated well. He then inserted [facility] Biopsy Marker Barrel shape lot [id number] exp 06-. Dr. Rajani Whitney then scanned second area 3:00 1 cm FN. He injected lido 2% into site followed by lido 1% with epi.

## 2023-09-14 ENCOUNTER — TELEPHONE (OUTPATIENT)
Dept: SURGERY | Age: 43
End: 2023-09-14

## 2023-09-14 NOTE — TELEPHONE ENCOUNTER
I informed the patient of her benign right breast biopsies results. The patient verbalized understanding of her benign results. I reviewed the patient's date/time/location of her appointment with Dr. Williams Weinstein. The patient verbalized understanding of the importance of this appointment for a CBE with Dr. Williams Weinstein. The patient is aware to call the office with any concerns. The patient has no questions at this time.

## 2023-09-14 NOTE — TELEPHONE ENCOUNTER
----- Message from Cristine Arreaga MD sent at 9/14/2023 10:11 AM EDT -----  Regarding: call with benign and concordant results    ----- Message -----  From: Dimitri Morton Incoming Lab Results From [facility]  Sent: 9/13/2023   2:39 PM EDT  To: Cristine Arreaga MD

## 2023-09-14 NOTE — TELEPHONE ENCOUNTER
----- Message from Sharad Charles MD sent at 9/14/2023 10:11 AM EDT -----  Regarding: call with benign and concordant results    ----- Message -----  From: Dimitri Morton Incoming Lab Results From [facility]  Sent: 9/13/2023   2:39 PM EDT  To: Sharad Charles MD

## 2023-09-27 ENCOUNTER — OFFICE VISIT (OUTPATIENT)
Dept: SURGERY | Age: 43
End: 2023-09-27
Payer: COMMERCIAL

## 2023-09-27 VITALS
DIASTOLIC BLOOD PRESSURE: 82 MMHG | SYSTOLIC BLOOD PRESSURE: 128 MMHG | WEIGHT: 233.6 LBS | HEIGHT: 64 IN | OXYGEN SATURATION: 97 % | HEART RATE: 93 BPM | TEMPERATURE: 97.3 F | BODY MASS INDEX: 39.88 KG/M2 | RESPIRATION RATE: 16 BRPM

## 2023-09-27 DIAGNOSIS — Z80.3 FAMILY HISTORY OF MALIGNANT NEOPLASM OF BREAST: ICD-10-CM

## 2023-09-27 DIAGNOSIS — Z91.89 AT HIGH RISK FOR BREAST CANCER: ICD-10-CM

## 2023-09-27 DIAGNOSIS — Z80.41 FAMILY HISTORY OF MALIGNANT NEOPLASM OF OVARY: ICD-10-CM

## 2023-09-27 DIAGNOSIS — N64.9 BREAST DISORDER: ICD-10-CM

## 2023-09-27 DIAGNOSIS — E66.01 OBESITY, CLASS III, BMI 40-49.9 (MORBID OBESITY) (HCC): ICD-10-CM

## 2023-09-27 DIAGNOSIS — R92.8 ABNORMAL MAMMOGRAM OF RIGHT BREAST: Primary | ICD-10-CM

## 2023-09-27 PROCEDURE — 99203 OFFICE O/P NEW LOW 30 MIN: CPT | Performed by: SURGERY

## 2023-09-27 RX ORDER — BIOTIN 2500 MCG
1 CAPSULE ORAL DAILY
COMMUNITY

## 2023-09-27 RX ORDER — LANOLIN ALCOHOL/MO/W.PET/CERES
400 CREAM (GRAM) TOPICAL DAILY
COMMUNITY

## 2023-09-27 RX ORDER — IBUPROFEN 800 MG/1
800 TABLET ORAL EVERY 6 HOURS PRN
COMMUNITY
Start: 2023-08-07

## 2023-09-27 RX ORDER — RIBOFLAVIN (VITAMIN B2) 100 MG
1 TABLET ORAL DAILY
COMMUNITY

## 2023-09-27 ASSESSMENT — ENCOUNTER SYMPTOMS
SORE THROAT: 0
COLOR CHANGE: 0
CHEST TIGHTNESS: 0
VOMITING: 0
SHORTNESS OF BREATH: 0
COUGH: 0
ABDOMINAL PAIN: 0
NAUSEA: 0

## 2023-09-27 NOTE — PROGRESS NOTES
detection was utilized in the interpretation of this exam. COMPARISON: Bilateral MLO and CC views of the breasts dated 08/19/2022 HISTORY: Screening. FINDINGS: Scattered fibroglandular densities are noted within the breast tissue bilaterally. Stellate density within the right breast at the approximate 10 o'clock position is noted 8 cm from the nipple. An asymmetry is noted within the middle 3rd of the right breast at the 3 o'clock position 3.7 cm from the nipple. Amorphous variably sized calcification is noted in this region. Tyrer-Cuzick 8 score of 21.7%     Incomplete evaluation of asymmetries within the right breast.  The patient should return for compression imaging of the right breast at the 9 o'clock position as well as spot magnification view of the right breast at the 3 o'clock position. The risk score is elevated to a lifetime risk greater than 20% at 21.7%. Recommendations include annual breast MRI according to criteria of the American Cancer Society guidelines and  the [facility] guidelines for screening MRI of the breast. BIRADS: BIRADS - CATEGORY 0 Incomplete: Needs Additional Imaging Evaluation OVERALL ASSESSMENT - INCOMPLETE:NEED ADDITIONAL IMAGING EVALUATION. ASSESSMENT       IMPRESSION :      ICD-10-CM    1. Abnormal mammogram of right breast  R92.8 EMILIO JOSE F DIGITAL DIAGNOSTIC UNILATERAL RIGHT     MRI BREAST BILATERAL W WO CONTRAST      2. At high risk for breast cancer  Z91.89 MRI BREAST BILATERAL W WO CONTRAST      3. Breast disorder  N64.9 EMILIO JOSE F DIGITAL DIAGNOSTIC UNILATERAL RIGHT      4. Family history of malignant neoplasm of breast  Z80.3 [facility] (2+79)      5. Family history of malignant neoplasm of ovary  Z80.41 [facility] (2+79)      6.  Obesity, Class III, BMI 40-49.9 (morbid obesity) ([address])  E66.01            PLAN:    PATHOLOGY Benign and concordant    According to the NCCN guidelines, the [employer] calculator can be used to

## 2023-10-19 LAB
Lab: NORMAL
Lab: NORMAL

## 2023-12-04 ENCOUNTER — OFFICE VISIT (OUTPATIENT)
Dept: FAMILY MEDICINE CLINIC | Age: 43
End: 2023-12-04
Payer: COMMERCIAL

## 2023-12-04 VITALS
BODY MASS INDEX: 39.9 KG/M2 | HEART RATE: 13 BPM | DIASTOLIC BLOOD PRESSURE: 80 MMHG | WEIGHT: 233.69 LBS | SYSTOLIC BLOOD PRESSURE: 126 MMHG | OXYGEN SATURATION: 98 % | RESPIRATION RATE: 113 BRPM | HEIGHT: 64 IN | TEMPERATURE: 97.7 F

## 2023-12-04 DIAGNOSIS — J02.9 SORE THROAT: ICD-10-CM

## 2023-12-04 DIAGNOSIS — J40 BRONCHITIS: Primary | ICD-10-CM

## 2023-12-04 LAB — S PYO AG THROAT QL: NORMAL

## 2023-12-04 PROCEDURE — 99213 OFFICE O/P EST LOW 20 MIN: CPT | Performed by: NURSE PRACTITIONER

## 2023-12-04 PROCEDURE — 87880 STREP A ASSAY W/OPTIC: CPT | Performed by: NURSE PRACTITIONER

## 2023-12-04 RX ORDER — AZITHROMYCIN 250 MG/1
250 TABLET, FILM COATED ORAL SEE ADMIN INSTRUCTIONS
Qty: 6 TABLET | Refills: 0 | Status: SHIPPED | OUTPATIENT
Start: 2023-12-04 | End: 2023-12-09

## 2023-12-04 RX ORDER — ALBUTEROL SULFATE 90 UG/1
2 AEROSOL, METERED RESPIRATORY (INHALATION) 4 TIMES DAILY PRN
Qty: 18 G | Refills: 0 | Status: SHIPPED | OUTPATIENT
Start: 2023-12-04

## 2023-12-04 ASSESSMENT — ENCOUNTER SYMPTOMS
CHEST TIGHTNESS: 1
RHINORRHEA: 0
SORE THROAT: 1
VOICE CHANGE: 0
SINUS PRESSURE: 0
WHEEZING: 1
VOMITING: 0
TROUBLE SWALLOWING: 1
SINUS PAIN: 0
NAUSEA: 0
SHORTNESS OF BREATH: 1
DIARRHEA: 0
COUGH: 1
ABDOMINAL PAIN: 0

## 2023-12-04 ASSESSMENT — PATIENT HEALTH QUESTIONNAIRE - PHQ9
SUM OF ALL RESPONSES TO PHQ QUESTIONS 1-9: 0
2. FEELING DOWN, DEPRESSED OR HOPELESS: 0
SUM OF ALL RESPONSES TO PHQ QUESTIONS 1-9: 0
SUM OF ALL RESPONSES TO PHQ9 QUESTIONS 1 & 2: 0
SUM OF ALL RESPONSES TO PHQ QUESTIONS 1-9: 0
SUM OF ALL RESPONSES TO PHQ QUESTIONS 1-9: 0
1. LITTLE INTEREST OR PLEASURE IN DOING THINGS: 0

## 2023-12-04 NOTE — PROGRESS NOTES
Subjective:      Patient ID: Mary Gomez is a 40 y.o. female who presents today for:  Chief Complaint   Patient presents with    Cough     Patient present today with a cough, tightness of her chest, back pain between her shoulder blades, and sore throat for a week. She tried dayquil and nyquil with little relief. HPI  Patient is here with c/o cough, chest tightness, and upper back pain for the last week. She is taking OTC cough and cold medications.     Past Medical History:   Diagnosis Date    Anxiety     Epilepsy ([address]) 2006, 2012    last grand mal 2016     Past Surgical History:   Procedure Laterality Date    EMILIO STEROTACTIC LOC BREAST BIOPSY RIGHT Right 9/12/2023    EMILIO STEROTACTIC LOC BREAST BIOPSY RIGHT [facility]    US BREAST BIOPSY NEEDLE ADDITIONAL RIGHT Right 9/12/2023    US BREAST BIOPSY NEEDLE ADDITIONAL RIGHT [facility] ULTRASOUND    US BREAST BIOPSY W LOC DEVICE 1ST LESION RIGHT Right 9/12/2023    US BREAST BIOPSY W LOC DEVICE 1ST LESION RIGHT [facility] ULTRASOUND    WISDOM TOOTH EXTRACTION       Social History     Socioeconomic History    Marital status: Single     Spouse name: Not on file    Number of children: Not on file    Years of education: Not on file    Highest education level: Not on file   Occupational History    Not on file   Tobacco Use    Smoking status: Never    Smokeless tobacco: Never   Vaping Use    Vaping Use: Never used   Substance and Sexual Activity    Alcohol use: No    Drug use: Never    Sexual activity: Not on file   Other Topics Concern    Not on file   Social History Narrative    Not on file     Social Determinants of Health     Financial Resource Strain: Low Risk  (3/7/2022)    Overall Financial Resource Strain (CARDIA)     Difficulty of Paying Living Expenses: Not hard at all   Food Insecurity: Not on file (3/11/2023)   Transportation Needs: No Transportation Needs (12/30/2020)    PRAPARE - Transportation     Lack of Transportation (Medical): No     Lack of

## 2023-12-08 ENCOUNTER — OFFICE VISIT (OUTPATIENT)
Dept: FAMILY MEDICINE CLINIC | Age: 43
End: 2023-12-08
Payer: COMMERCIAL

## 2023-12-08 VITALS
BODY MASS INDEX: 39.9 KG/M2 | RESPIRATION RATE: 13 BRPM | DIASTOLIC BLOOD PRESSURE: 72 MMHG | TEMPERATURE: 98.4 F | WEIGHT: 233.69 LBS | SYSTOLIC BLOOD PRESSURE: 114 MMHG | OXYGEN SATURATION: 97 % | HEART RATE: 102 BPM | HEIGHT: 64 IN

## 2023-12-08 DIAGNOSIS — J40 BRONCHITIS: Primary | ICD-10-CM

## 2023-12-08 PROCEDURE — 99213 OFFICE O/P EST LOW 20 MIN: CPT | Performed by: NURSE PRACTITIONER

## 2023-12-08 RX ORDER — PREDNISONE 20 MG/1
20 TABLET ORAL 2 TIMES DAILY
Qty: 10 TABLET | Refills: 0 | Status: SHIPPED | OUTPATIENT
Start: 2023-12-08 | End: 2023-12-13

## 2023-12-08 ASSESSMENT — ENCOUNTER SYMPTOMS
RHINORRHEA: 1
EYE REDNESS: 0
TROUBLE SWALLOWING: 0
EYE PAIN: 0
ABDOMINAL PAIN: 0
COUGH: 1
EYE ITCHING: 0
SINUS PRESSURE: 1
WHEEZING: 0
CHEST TIGHTNESS: 1
VOICE CHANGE: 0
SINUS PAIN: 1
SORE THROAT: 1
DIARRHEA: 0
VOMITING: 0
NAUSEA: 0
SHORTNESS OF BREATH: 1
EYE DISCHARGE: 0

## 2023-12-08 NOTE — PROGRESS NOTES
Subjective:      Patient ID: Aubree Coronel is a 40 y.o. female who presents today for:  Chief Complaint   Patient presents with    Congestion     Patient present today with being diagnosed with bronchitis on Monday at the walk in and was told if she is worse, to return and she is worse. She is finding it hard to breathe with chest and back pain when she breaths and weak. She has taken the medication prescribed plus the dayquil and nyquil with some relief. HPI  Patient is here with c/o continued cough and SOB. She was seen Monday. She is currently on ANTB and was given albuterol. Started to feel better 2 days ago and then hit her again Thursday. She is more SOB today.    She has HX bronchitis int he past.     Past Medical History:   Diagnosis Date    Anxiety     Epilepsy ([address]) 2006, 2012    last grand mal 2016     Past Surgical History:   Procedure Laterality Date    EMILIO STEROTACTIC LOC BREAST BIOPSY RIGHT Right 9/12/2023    EMILIO STEROTACTIC LOC BREAST BIOPSY RIGHT [facility]    US BREAST BIOPSY NEEDLE ADDITIONAL RIGHT Right 9/12/2023    US BREAST BIOPSY NEEDLE ADDITIONAL RIGHT [facility] ULTRASOUND    US BREAST BIOPSY W LOC DEVICE 1ST LESION RIGHT Right 9/12/2023    US BREAST BIOPSY W LOC DEVICE 1ST LESION RIGHT [facility] ULTRASOUND    WISDOM TOOTH EXTRACTION       Social History     Socioeconomic History    Marital status: Single     Spouse name: Not on file    Number of children: Not on file    Years of education: Not on file    Highest education level: Not on file   Occupational History    Not on file   Tobacco Use    Smoking status: Never    Smokeless tobacco: Never   Vaping Use    Vaping Use: Never used   Substance and Sexual Activity    Alcohol use: No    Drug use: Never    Sexual activity: Not on file   Other Topics Concern    Not on file   Social History Narrative    Not on file     Social Determinants of Health     Financial Resource Strain: Low Risk  (3/7/2022)    Overall Financial Resource Strain

## 2024-01-05 ENCOUNTER — HOSPITAL ENCOUNTER (OUTPATIENT)
Dept: MRI IMAGING | Age: 44
Discharge: HOME | End: 2024-01-05
Payer: COMMERCIAL

## 2024-01-05 DIAGNOSIS — Z91.89 AT HIGH RISK FOR BREAST CANCER: ICD-10-CM

## 2024-01-05 DIAGNOSIS — R92.8 ABNORMAL MAMMOGRAM OF RIGHT BREAST: ICD-10-CM

## 2024-01-05 PROCEDURE — 6360000004 HC RX CONTRAST MEDICATION: Performed by: SURGERY

## 2024-01-05 PROCEDURE — A9577 INJ MULTIHANCE: HCPCS | Performed by: SURGERY

## 2024-01-05 PROCEDURE — C8908 MRI W/O FOL W/CONT, BREAST,: HCPCS

## 2024-01-05 RX ADMIN — GADOBENATE DIMEGLUMINE 20 ML: 529 INJECTION, SOLUTION INTRAVENOUS at 13:26

## 2024-01-08 NOTE — RESULT ENCOUNTER NOTE
Your recent imaging was benign. No evidence of cancer was found. Will see you in clinic soon. cough  r/o aspiration

## 2024-01-30 ENCOUNTER — PATIENT MESSAGE (OUTPATIENT)
Dept: FAMILY MEDICINE CLINIC | Age: 44
End: 2024-01-30

## 2024-01-30 DIAGNOSIS — G40.909 NONINTRACTABLE EPILEPSY WITHOUT STATUS EPILEPTICUS, UNSPECIFIED EPILEPSY TYPE (HCC): ICD-10-CM

## 2024-01-31 RX ORDER — LEVETIRACETAM 500 MG/1
500 TABLET ORAL 2 TIMES DAILY
Qty: 180 TABLET | Refills: 1 | Status: SHIPPED | OUTPATIENT
Start: 2024-01-31

## 2024-01-31 RX ORDER — LAMOTRIGINE 200 MG/1
200 TABLET ORAL 2 TIMES DAILY
Qty: 180 TABLET | Refills: 1 | Status: SHIPPED | OUTPATIENT
Start: 2024-01-31

## 2024-01-31 NOTE — TELEPHONE ENCOUNTER
From: Teresa Ballesteros  To: Dr. Elena Hopson  Sent: 1/30/2024 5:39 PM EST  Subject: Medication Refills     I need to request a medication refill for my prescriptions of Lamotrigine and Keppra.     Thank you

## 2024-03-26 ENCOUNTER — TELEPHONE (OUTPATIENT)
Dept: SURGERY | Age: 44
End: 2024-03-26

## 2024-03-26 NOTE — TELEPHONE ENCOUNTER
LM for patient to contact our office to have her scheduled appt on 4/1/2024 moved to Gely Plascencia schedule as discussed at her last appointment.

## 2024-04-01 ENCOUNTER — OFFICE VISIT (OUTPATIENT)
Dept: RADIATION ONCOLOGY | Age: 44
End: 2024-04-01
Payer: COMMERCIAL

## 2024-04-01 VITALS
HEIGHT: 64 IN | HEART RATE: 102 BPM | DIASTOLIC BLOOD PRESSURE: 71 MMHG | SYSTOLIC BLOOD PRESSURE: 125 MMHG | WEIGHT: 223 LBS | BODY MASS INDEX: 38.07 KG/M2 | RESPIRATION RATE: 16 BRPM | TEMPERATURE: 98.4 F | OXYGEN SATURATION: 98 %

## 2024-04-01 DIAGNOSIS — Z91.89 INCREASED RISK OF BREAST CANCER: Primary | ICD-10-CM

## 2024-04-01 PROCEDURE — 99213 OFFICE O/P EST LOW 20 MIN: CPT | Performed by: NURSE PRACTITIONER

## 2024-04-01 ASSESSMENT — ENCOUNTER SYMPTOMS
CONSTIPATION: 0
SORE THROAT: 1
BLOOD IN STOOL: 0
VOMITING: 0
ABDOMINAL PAIN: 1
EYE DISCHARGE: 0
NAUSEA: 1
DIARRHEA: 0
BACK PAIN: 0
COUGH: 0

## 2024-04-01 ASSESSMENT — PATIENT HEALTH QUESTIONNAIRE - PHQ9
SUM OF ALL RESPONSES TO PHQ QUESTIONS 1-9: 2
2. FEELING DOWN, DEPRESSED OR HOPELESS: SEVERAL DAYS
SUM OF ALL RESPONSES TO PHQ9 QUESTIONS 1 & 2: 2
SUM OF ALL RESPONSES TO PHQ9 QUESTIONS 1 & 2: 2
2. FEELING DOWN, DEPRESSED OR HOPELESS: SEVERAL DAYS
1. LITTLE INTEREST OR PLEASURE IN DOING THINGS: SEVERAL DAYS
1. LITTLE INTEREST OR PLEASURE IN DOING THINGS: SEVERAL DAYS

## 2024-04-01 NOTE — PROGRESS NOTES
[phone]  FINAL SURGICAL PATHOLOGY REPORT  Patient Name:  JASBIR AMAYA              Accession No:  [id number]   Age Sex:   1980                   Location:      [facility]  Account No:    [id number]                  Collected:     2023  [facility] Rec No:    [id number]                   Received:      2023  Attend Phys:                                Completed:     2023  Perform Phys:  MABLE SOLIS          FINAL DIAGNOSIS:  A. RIGHT BREAST CORE BIOPSY STEREO \"A\"-  MULTIPLE MICROCALCIFICATIONS.  SCLEROSING ADENOSIS.  FOCAL FIBROCYSTIC AND COLUMNAR CELL CHANGES.    B. RIGHT BREAST CORE BIOPSY 10 O'CLOCK 8 CM FN-  FIBROADENOMA.    C. RIGHT BREAST CORE BIOPSY 3 O'CLOCK 1 CM FN-  BREAST PARENCHYMA WITH NO SIGNIFICANT PATHOLOGIC CHANGES.   ALIFA/ALIFA                IMPRESSION:     ICD-10-CM    1. Increased risk of breast cancer  Z91.89            PLAN:  Recommend continue breast awareness, clinical breast exam every 6 months.   Due for right diagnostic mammogram now, scheduled tomorrow. Discussed next bilateral mammogram 2024 and breast MRI 2025 if imaging normal.   Encouraged healthy lifestyle (I.e. regular exercise, healthy diet, maintain healthy BMI, limit alcohol, smoking cessation) for breast cancer risk reduction  Encouraged follow up PCP for all other health concerns  Recommend RTC in 6 months for clinical exam    She was advised to call back if any significant changes in her exam or symptoms in the meantime    ORDERS  No orders of the defined types were placed in this encounter.      The patient was encouraged to call back with any questions or concerns regarding her care.    CARROLL Parish-CNP  [facility], Breast Surgery and Oncology Support  [phone] (phone)

## 2024-04-02 ENCOUNTER — HOSPITAL ENCOUNTER (OUTPATIENT)
Dept: WOMENS IMAGING | Age: 44
Discharge: HOME | End: 2024-04-04
Attending: SURGERY
Payer: COMMERCIAL

## 2024-04-02 DIAGNOSIS — N64.9 BREAST DISORDER: ICD-10-CM

## 2024-04-02 DIAGNOSIS — R92.8 ABNORMAL MAMMOGRAM OF RIGHT BREAST: ICD-10-CM

## 2024-04-02 PROCEDURE — G0279 TOMOSYNTHESIS, MAMMO: HCPCS

## 2024-04-04 ENCOUNTER — OFFICE VISIT (OUTPATIENT)
Dept: FAMILY MEDICINE CLINIC | Age: 44
End: 2024-04-04
Payer: COMMERCIAL

## 2024-04-04 VITALS
HEART RATE: 106 BPM | BODY MASS INDEX: 39.09 KG/M2 | DIASTOLIC BLOOD PRESSURE: 84 MMHG | HEIGHT: 64 IN | SYSTOLIC BLOOD PRESSURE: 126 MMHG | OXYGEN SATURATION: 99 % | WEIGHT: 229 LBS | TEMPERATURE: 97.3 F

## 2024-04-04 DIAGNOSIS — Z11.52 ENCOUNTER FOR SCREENING FOR COVID-19: ICD-10-CM

## 2024-04-04 DIAGNOSIS — B96.89 ACUTE BACTERIAL SINUSITIS: ICD-10-CM

## 2024-04-04 DIAGNOSIS — J01.90 ACUTE BACTERIAL SINUSITIS: ICD-10-CM

## 2024-04-04 DIAGNOSIS — G40.909 NONINTRACTABLE EPILEPSY WITHOUT STATUS EPILEPTICUS, UNSPECIFIED EPILEPSY TYPE (HCC): Primary | ICD-10-CM

## 2024-04-04 DIAGNOSIS — F41.9 ANXIETY: ICD-10-CM

## 2024-04-04 DIAGNOSIS — J98.9 RESPIRATORY ILLNESS: ICD-10-CM

## 2024-04-04 LAB
INFLUENZA A ANTIBODY: NORMAL
INFLUENZA B ANTIBODY: NORMAL
Lab: 1234
PERFORMING INSTRUMENT: NORMAL
QC PASS/FAIL: NORMAL
SARS-COV-2, POC: NORMAL

## 2024-04-04 PROCEDURE — 87426 SARSCOV CORONAVIRUS AG IA: CPT | Performed by: FAMILY MEDICINE

## 2024-04-04 PROCEDURE — 99214 OFFICE O/P EST MOD 30 MIN: CPT | Performed by: FAMILY MEDICINE

## 2024-04-04 PROCEDURE — 87804 INFLUENZA ASSAY W/OPTIC: CPT | Performed by: FAMILY MEDICINE

## 2024-04-04 RX ORDER — AMOXICILLIN AND CLAVULANATE POTASSIUM 875; 125 MG/1; MG/1
1 TABLET, FILM COATED ORAL 2 TIMES DAILY
Qty: 20 TABLET | Refills: 0 | Status: SHIPPED | OUTPATIENT
Start: 2024-04-04 | End: 2024-04-14

## 2024-04-04 RX ORDER — LAMOTRIGINE 200 MG/1
200 TABLET ORAL 2 TIMES DAILY
Qty: 180 TABLET | Refills: 3 | Status: SHIPPED | OUTPATIENT
Start: 2024-04-04

## 2024-04-04 RX ORDER — LEVETIRACETAM 500 MG/1
500 TABLET ORAL 2 TIMES DAILY
Qty: 180 TABLET | Refills: 3 | Status: SHIPPED | OUTPATIENT
Start: 2024-04-04

## 2024-04-04 SDOH — ECONOMIC STABILITY: HOUSING INSECURITY
IN THE LAST 12 MONTHS, WAS THERE A TIME WHEN YOU DID NOT HAVE A STEADY PLACE TO SLEEP OR SLEPT IN A SHELTER (INCLUDING NOW)?: NO

## 2024-04-04 SDOH — ECONOMIC STABILITY: FOOD INSECURITY: WITHIN THE PAST 12 MONTHS, YOU WORRIED THAT YOUR FOOD WOULD RUN OUT BEFORE YOU GOT MONEY TO BUY MORE.: NEVER TRUE

## 2024-04-04 SDOH — ECONOMIC STABILITY: FOOD INSECURITY: WITHIN THE PAST 12 MONTHS, THE FOOD YOU BOUGHT JUST DIDN'T LAST AND YOU DIDN'T HAVE MONEY TO GET MORE.: NEVER TRUE

## 2024-04-04 SDOH — ECONOMIC STABILITY: INCOME INSECURITY: HOW HARD IS IT FOR YOU TO PAY FOR THE VERY BASICS LIKE FOOD, HOUSING, MEDICAL CARE, AND HEATING?: NOT HARD AT ALL

## 2024-04-04 NOTE — PROGRESS NOTES
Chief Complaint   Patient presents with    1 Year Follow Up     Patient has had a head cold for a week now. Headaches.        HPI: Teresa Ballesteros 43 y.o. female presenting for     Anxiety   History of anxiety   Stable with propanolol   No issues or concerns     Family history of breast cancer   Had a mammogram that showed 3 nodules   One in the 10'oclock position will be monitored more closely   Will have mammograms every 6 months     Illness   Reports its been going on for 1 week   Reports that she is having chills, dizziness  Denies wheezing   Admits to coughing and upper congestion and sore throat.   Has a history of strep  Denoes feers     Seizure   Stable   Takes lamictal and keppra  Current Outpatient Medications   Medication Sig Dispense Refill    lamoTRIgine (LAMICTAL) 200 MG tablet Take 1 tablet by mouth 2 times daily 180 tablet 1    levETIRAcetam (KEPPRA) 500 MG tablet Take 1 tablet by mouth 2 times daily 180 tablet 1    ibuprofen (ADVIL;MOTRIN) 800 MG tablet Take 1 tablet by mouth every 6 hours as needed      EVENING PRIMROSE OIL PO Take 1 capsule by mouth daily      Vitamin E 134 MG (200 UNIT) TABS Take 1 tablet by mouth daily      Biotin 2500 MCG CAPS Take 1 capsule by mouth daily      folic acid (FOLVITE) 400 MCG tablet Take 1 tablet by mouth daily      Multiple Vitamins-Minerals (MULTIVITAMIN ADULT, MINERALS,) TABS Take 1 tablet by mouth daily      propranolol (INDERAL) 10 MG tablet TAKE 1 TABLET BY MOUTH 2 TIMES DAILY AS NEEDED (ANXIETY). 60 tablet 5     No current facility-administered medications for this visit.        ROS  CONSTITUTIONAL: The patient denies fevers, admits to chills, denies  sweats and body ache.  HEENT:, blurry vision, eye pain, tinnitus, vertigo,  sore throat, neck or thyroid masses.  Right ear pain. Admits to a headaches.   RESPIRATORY: admits to coughing and congestion.   CARDIAC: Denies chest pain, pressure, palpitations, Denies lower extremity edema.  GASTROINTESTINAL: Denies

## 2024-07-14 LAB
Lab: NEGATIVE
Lab: NORMAL

## 2024-07-22 ENCOUNTER — PATIENT MESSAGE (OUTPATIENT)
Dept: FAMILY MEDICINE CLINIC | Age: 44
End: 2024-07-22

## 2024-07-22 DIAGNOSIS — G40.909 NONINTRACTABLE EPILEPSY WITHOUT STATUS EPILEPTICUS, UNSPECIFIED EPILEPSY TYPE (HCC): ICD-10-CM

## 2024-07-23 RX ORDER — LEVETIRACETAM 500 MG/1
500 TABLET ORAL 2 TIMES DAILY
Qty: 180 TABLET | Refills: 3 | Status: SHIPPED | OUTPATIENT
Start: 2024-07-23

## 2024-07-23 RX ORDER — LAMOTRIGINE 200 MG/1
200 TABLET ORAL 2 TIMES DAILY
Qty: 180 TABLET | Refills: 3 | Status: SHIPPED | OUTPATIENT
Start: 2024-07-23

## 2024-07-23 NOTE — TELEPHONE ENCOUNTER
From: Teresa Ballesteros  To: Dr. Elena Hopson  Sent: 7/22/2024 7:20 PM EDT  Subject: Refills on lamotrigine and keppra    My insurance has changed and I need to have a new prescription sent to [facility] for my home delivery. Their address is [facility] [id number] [place] [address].     Thank you,   Teresa

## 2024-07-24 ENCOUNTER — PATIENT MESSAGE (OUTPATIENT)
Dept: FAMILY MEDICINE CLINIC | Age: 44
End: 2024-07-24

## 2024-07-24 NOTE — TELEPHONE ENCOUNTER
From: Teresa Ballesteros  To: Dr. Elena Hopson  Sent: 7/24/2024 10:48 AM EDT  Subject: Appointment Request    Appointment Request From: Teresa Ballesteros    With Provider: Elena Hopson MD [[facility]]    Preferred Date Range: 7/29/2024 – 8/9/2024    Preferred Times: Any Time    Reason for visit: Office Visit    Comments:  I'm having severe headaches nearly everyday with nausea.

## 2024-07-25 ENCOUNTER — OFFICE VISIT (OUTPATIENT)
Dept: FAMILY MEDICINE CLINIC | Age: 44
End: 2024-07-25
Payer: COMMERCIAL

## 2024-07-25 VITALS
BODY MASS INDEX: 40.29 KG/M2 | WEIGHT: 236 LBS | HEART RATE: 88 BPM | DIASTOLIC BLOOD PRESSURE: 82 MMHG | TEMPERATURE: 97.7 F | OXYGEN SATURATION: 98 % | HEIGHT: 64 IN | SYSTOLIC BLOOD PRESSURE: 110 MMHG

## 2024-07-25 DIAGNOSIS — R51.9 PERSISTENT HEADACHES: Primary | ICD-10-CM

## 2024-07-25 DIAGNOSIS — E66.01 OBESITY, CLASS III, BMI 40-49.9 (MORBID OBESITY) (HCC): ICD-10-CM

## 2024-07-25 PROCEDURE — 99214 OFFICE O/P EST MOD 30 MIN: CPT | Performed by: FAMILY MEDICINE

## 2024-07-25 RX ORDER — SUMATRIPTAN 50 MG/1
TABLET, FILM COATED ORAL
Qty: 9 TABLET | Refills: 1 | Status: SHIPPED | OUTPATIENT
Start: 2024-07-25 | End: 2024-07-25

## 2024-07-25 RX ORDER — PROPRANOLOL HYDROCHLORIDE 20 MG/1
20 TABLET ORAL 2 TIMES DAILY
Qty: 60 TABLET | Refills: 5 | Status: SHIPPED | OUTPATIENT
Start: 2024-07-25

## 2024-07-25 RX ORDER — SUMATRIPTAN 50 MG/1
TABLET, FILM COATED ORAL
Qty: 9 TABLET | Refills: 1 | Status: SHIPPED | OUTPATIENT
Start: 2024-07-25

## 2024-07-25 RX ORDER — PROPRANOLOL HYDROCHLORIDE 20 MG/1
20 TABLET ORAL 2 TIMES DAILY
Qty: 60 TABLET | Refills: 5 | Status: SHIPPED | OUTPATIENT
Start: 2024-07-25 | End: 2024-07-25

## 2024-07-25 NOTE — PROGRESS NOTES
rhythm, normal S1, S2, no murmurs, rubs, clicks or gallops  Abdomen - soft, nontender, nondistended, no masses or organomegaly   Back exam - full range of motion, no tenderness, palpable spasm or pain on motion   Neurological - alert, oriented, normal speech, no focal findings or movement disorder noted   Musculoskeletal - no joint tenderness, deformity or swelling   Extremities - peripheral pulses normal, no pedal edema, no clubbing or cyanosis   Skin - normal coloration and turgor, no rashes, no suspicious skin lesions noted    Labs   No components found for: \"TSHREFLEX\"    TSH   Date Value Ref Range Status   01/18/2021 2.580 0.440 - 3.860 uIU/mL Final   01/06/2018 2.390 0.270 - 4.200 uIU/mL Final     Lab Results   Component Value Date     03/28/2022    K 4.1 03/28/2022     03/28/2022    CO2 26 03/28/2022    BUN 12 03/28/2022    CREATININE 0.59 03/28/2022    GLUCOSE 91 03/28/2022    CALCIUM 9.8 03/28/2022    BILITOT 0.4 03/28/2022    ALKPHOS 74 03/28/2022    AST 16 03/28/2022    ALT 13 03/28/2022    LABGLOM >60.0 03/28/2022    GFRAA >60.0 03/28/2022    GLOB 2.9 03/28/2022       Lab Results   Component Value Date    WBC 7.0 01/18/2021    HGB 13.3 01/18/2021    HCT 40.4 01/18/2021    MCV 91.8 01/18/2021     01/18/2021     No results found for: \"LABA1C\"  No results found for: \"EAG\"      A/P: Teresa Ballesteros 43 y.o. female presenting for     1. Persistent headaches  Waking up with early morning headaches.  Persistent.  Has been going on for least a month now.  Will get a CAT scan for further evaluation.  Will do a trial of migraine medication to see if it helps.  - CT HEAD WO CONTRAST; Future  - SUMAtriptan (IMITREX) 50 MG tablet; Take 1 tablet by mouth daily PRN for headache. If symptoms persist more than 2 hours can take a second dose.  Dispense: 9 tablet; Refill: 1  - propranolol (INDERAL) 20 MG tablet; Take 1 tablet by mouth 2 times daily  Dispense: 60 tablet; Refill: 5    2. Obesity, Class III,

## 2024-10-29 DIAGNOSIS — Z12.31 ENCOUNTER FOR SCREENING MAMMOGRAM FOR MALIGNANT NEOPLASM OF BREAST: Primary | ICD-10-CM

## 2024-11-14 ENCOUNTER — HOSPITAL ENCOUNTER (OUTPATIENT)
Dept: WOMENS IMAGING | Age: 44
Discharge: HOME | End: 2024-11-16
Attending: FAMILY MEDICINE

## 2024-11-14 DIAGNOSIS — Z12.31 ENCOUNTER FOR SCREENING MAMMOGRAM FOR MALIGNANT NEOPLASM OF BREAST: ICD-10-CM

## 2024-11-14 PROCEDURE — 77067 SCR MAMMO BI INCL CAD: CPT

## 2024-11-30 DIAGNOSIS — G40.909 NONINTRACTABLE EPILEPSY WITHOUT STATUS EPILEPTICUS, UNSPECIFIED EPILEPSY TYPE (HCC): ICD-10-CM

## 2024-12-02 RX ORDER — LEVETIRACETAM 500 MG/1
500 TABLET ORAL 2 TIMES DAILY
Qty: 180 TABLET | Refills: 3 | Status: SHIPPED | OUTPATIENT
Start: 2024-12-02

## 2024-12-02 RX ORDER — LAMOTRIGINE 200 MG/1
200 TABLET ORAL 2 TIMES DAILY
Qty: 180 TABLET | Refills: 3 | Status: SHIPPED | OUTPATIENT
Start: 2024-12-02

## 2025-02-11 ENCOUNTER — OFFICE VISIT (OUTPATIENT)
Age: 45
End: 2025-02-11

## 2025-02-11 VITALS
DIASTOLIC BLOOD PRESSURE: 70 MMHG | TEMPERATURE: 97.9 F | HEART RATE: 73 BPM | RESPIRATION RATE: 20 BRPM | BODY MASS INDEX: 40.29 KG/M2 | WEIGHT: 236 LBS | SYSTOLIC BLOOD PRESSURE: 108 MMHG | HEIGHT: 64 IN | OXYGEN SATURATION: 98 %

## 2025-02-11 DIAGNOSIS — R11.2 NAUSEA AND VOMITING, UNSPECIFIED VOMITING TYPE: ICD-10-CM

## 2025-02-11 DIAGNOSIS — R05.1 ACUTE COUGH: ICD-10-CM

## 2025-02-11 DIAGNOSIS — R51.9 NONINTRACTABLE HEADACHE, UNSPECIFIED CHRONICITY PATTERN, UNSPECIFIED HEADACHE TYPE: ICD-10-CM

## 2025-02-11 DIAGNOSIS — R50.9 FEVER, UNSPECIFIED FEVER CAUSE: ICD-10-CM

## 2025-02-11 DIAGNOSIS — R19.7 DIARRHEA, UNSPECIFIED TYPE: ICD-10-CM

## 2025-02-11 DIAGNOSIS — J02.9 SORE THROAT: ICD-10-CM

## 2025-02-11 DIAGNOSIS — J10.1 INFLUENZA A: Primary | ICD-10-CM

## 2025-02-11 LAB
INFLUENZA A ANTIBODY: POSITIVE
INFLUENZA B ANTIBODY: NEGATIVE
S PYO AG THROAT QL: NORMAL

## 2025-02-11 RX ORDER — FLUTICASONE PROPIONATE 50 MCG
1 SPRAY, SUSPENSION (ML) NASAL DAILY
Qty: 1 EACH | Refills: 0 | Status: CANCELLED | OUTPATIENT
Start: 2025-02-11

## 2025-02-11 RX ORDER — OSELTAMIVIR PHOSPHATE 75 MG/1
75 CAPSULE ORAL 2 TIMES DAILY
Qty: 10 CAPSULE | Refills: 0 | Status: SHIPPED | OUTPATIENT
Start: 2025-02-11 | End: 2025-02-16

## 2025-02-11 RX ORDER — BENZONATATE 100 MG/1
100 CAPSULE ORAL 3 TIMES DAILY PRN
Qty: 30 CAPSULE | Refills: 0 | Status: SHIPPED | OUTPATIENT
Start: 2025-02-11 | End: 2025-02-21

## 2025-02-11 RX ORDER — ACETAMINOPHEN AND CHLORPHENIRAMINE MALEATE 325; 2 MG/1; MG/1
1 TABLET, FILM COATED ORAL EVERY 4 HOURS PRN
Qty: 20 TABLET | Refills: 0 | Status: CANCELLED | OUTPATIENT
Start: 2025-02-11

## 2025-02-11 RX ORDER — DEXTROMETHORPHAN HBR AND GUAIFENESIN 5; 100 MG/5ML; MG/5ML
5 LIQUID ORAL EVERY 4 HOURS PRN
Qty: 355 ML | Refills: 0 | Status: SHIPPED | OUTPATIENT
Start: 2025-02-11

## 2025-02-11 RX ORDER — GLYCERIN 0.25 %
1 DROPS OPHTHALMIC (EYE) PRN
Qty: 1 EACH | Refills: 0 | Status: CANCELLED | OUTPATIENT
Start: 2025-02-11

## 2025-02-11 SDOH — ECONOMIC STABILITY: FOOD INSECURITY: WITHIN THE PAST 12 MONTHS, YOU WORRIED THAT YOUR FOOD WOULD RUN OUT BEFORE YOU GOT MONEY TO BUY MORE.: NEVER TRUE

## 2025-02-11 SDOH — ECONOMIC STABILITY: FOOD INSECURITY: WITHIN THE PAST 12 MONTHS, THE FOOD YOU BOUGHT JUST DIDN'T LAST AND YOU DIDN'T HAVE MONEY TO GET MORE.: NEVER TRUE

## 2025-02-11 ASSESSMENT — PATIENT HEALTH QUESTIONNAIRE - PHQ9
SUM OF ALL RESPONSES TO PHQ QUESTIONS 1-9: 0
SUM OF ALL RESPONSES TO PHQ9 QUESTIONS 1 & 2: 0
SUM OF ALL RESPONSES TO PHQ QUESTIONS 1-9: 0
SUM OF ALL RESPONSES TO PHQ QUESTIONS 1-9: 0
2. FEELING DOWN, DEPRESSED OR HOPELESS: NOT AT ALL
SUM OF ALL RESPONSES TO PHQ QUESTIONS 1-9: 0
1. LITTLE INTEREST OR PLEASURE IN DOING THINGS: NOT AT ALL

## 2025-02-11 ASSESSMENT — ENCOUNTER SYMPTOMS
COUGH: 1
VOMITING: 1
DIARRHEA: 1

## 2025-02-11 NOTE — PROGRESS NOTES
Subjective  Teresa Ballesteros, 44 y.o. female presents today with:  Chief Complaint   Patient presents with    Fever     Patient present today with a sore throat which hurts to swallow, diarrhea, vomiting, headache, little cough, and fever since Saturday. She tried cold and flu medications with no relief. Patient declined the Covid test.     She has been feeling ill for the past 3 days-symptoms of sore throat, headache, cough, fever, vomiting, diarrhea, loss of appetite, and body aches.  Declined COVID testing.  Is a .  A lot of kids have the flu currently.    Review of Systems   Constitutional:  Positive for fever.   Respiratory:  Positive for cough.    Gastrointestinal:  Positive for diarrhea and vomiting.   Musculoskeletal:  Positive for arthralgias.   Neurological:  Positive for headaches.       Past Medical History:   Diagnosis Date    Anxiety     Epilepsy (HCC) 2006, 2012    last grand mal 2016     Past Surgical History:   Procedure Laterality Date    EMILIO STEREO BREAST BX W LOC DEVICE 1ST LESION RIGHT Right 09/12/2023    EMILIO STEROTACTIC LOC BREAST BIOPSY RIGHT [facility]    US BREAST BIOPSY W LOC DEVICE 1ST LESION RIGHT Right 09/12/2023    US BREAST BIOPSY W LOC DEVICE 1ST LESION RIGHT [facility]    US BREAST BIOPSY W LOC DEVICE EACH ADDL LESION RIGHT Right 09/12/2023    US BREAST BIOPSY NEEDLE ADDITIONAL RIGHT [facility]    WISDOM TOOTH EXTRACTION       Current Outpatient Medications   Medication Sig Dispense Refill    benzonatate (TESSALON) 100 MG capsule Take 1 capsule by mouth 3 times daily as needed for Cough 30 capsule 0    Dextromethorphan-guaiFENesin (ROBITUSSIN COUGH+CHEST ELLA DM) 5-100 MG/5ML LIQD liquid Take 5 mLs by mouth every 4 hours as needed for Cough or Congestion 355 mL 0    oseltamivir (TAMIFLU) 75 MG capsule Take 1 capsule by mouth 2 times daily for 5 days 10 capsule 0    levETIRAcetam (KEPPRA) 500 MG tablet Take 1 tablet by mouth 2 times daily 180 tablet 3

## 2025-02-12 DIAGNOSIS — R51.9 PERSISTENT HEADACHES: ICD-10-CM

## 2025-02-12 RX ORDER — PROPRANOLOL HCL 20 MG
20 TABLET ORAL 2 TIMES DAILY
Qty: 180 TABLET | Refills: 2 | Status: SHIPPED | OUTPATIENT
Start: 2025-02-12

## 2025-04-08 DIAGNOSIS — R51.9 PERSISTENT HEADACHES: ICD-10-CM

## 2025-04-08 RX ORDER — SUMATRIPTAN 50 MG/1
TABLET, FILM COATED ORAL
Qty: 9 TABLET | Refills: 3 | Status: SHIPPED | OUTPATIENT
Start: 2025-04-08

## 2025-05-20 ENCOUNTER — OFFICE VISIT (OUTPATIENT)
Age: 45
End: 2025-05-20
Payer: COMMERCIAL

## 2025-05-20 VITALS
DIASTOLIC BLOOD PRESSURE: 72 MMHG | WEIGHT: 245 LBS | HEIGHT: 64 IN | HEART RATE: 82 BPM | BODY MASS INDEX: 41.83 KG/M2 | OXYGEN SATURATION: 97 % | SYSTOLIC BLOOD PRESSURE: 114 MMHG | TEMPERATURE: 98 F

## 2025-05-20 DIAGNOSIS — J04.0 LARYNGITIS: ICD-10-CM

## 2025-05-20 DIAGNOSIS — J06.9 VIRAL URI: Primary | ICD-10-CM

## 2025-05-20 DIAGNOSIS — H65.00 NON-RECURRENT ACUTE SEROUS OTITIS MEDIA, UNSPECIFIED LATERALITY: ICD-10-CM

## 2025-05-20 DIAGNOSIS — J02.9 SORE THROAT: ICD-10-CM

## 2025-05-20 LAB
STREP PYOGENES DNA, POC: NEGATIVE
VALID INTERNAL CONTROL, POC: NORMAL

## 2025-05-20 PROCEDURE — 87651 STREP A DNA AMP PROBE: CPT | Performed by: NURSE PRACTITIONER

## 2025-05-20 PROCEDURE — 99213 OFFICE O/P EST LOW 20 MIN: CPT | Performed by: NURSE PRACTITIONER

## 2025-05-20 RX ORDER — METHYLPREDNISOLONE 4 MG/1
TABLET ORAL
Qty: 1 KIT | Refills: 0 | Status: SHIPPED | OUTPATIENT
Start: 2025-05-20 | End: 2025-05-26

## 2025-05-20 RX ORDER — AMOXICILLIN 875 MG/1
875 TABLET, COATED ORAL 2 TIMES DAILY
Qty: 14 TABLET | Refills: 0 | Status: SHIPPED | OUTPATIENT
Start: 2025-05-20 | End: 2025-05-27

## 2025-05-20 SDOH — ECONOMIC STABILITY: FOOD INSECURITY: WITHIN THE PAST 12 MONTHS, YOU WORRIED THAT YOUR FOOD WOULD RUN OUT BEFORE YOU GOT MONEY TO BUY MORE.: NEVER TRUE

## 2025-05-20 SDOH — ECONOMIC STABILITY: FOOD INSECURITY: WITHIN THE PAST 12 MONTHS, THE FOOD YOU BOUGHT JUST DIDN'T LAST AND YOU DIDN'T HAVE MONEY TO GET MORE.: NEVER TRUE

## 2025-05-20 ASSESSMENT — ENCOUNTER SYMPTOMS
VOMITING: 0
COUGH: 1
SINUS PRESSURE: 0
ABDOMINAL PAIN: 0
WHEEZING: 0
NAUSEA: 0
SHORTNESS OF BREATH: 0
VOICE CHANGE: 1
DIARRHEA: 0
EYE DISCHARGE: 0
EYE REDNESS: 0
RHINORRHEA: 0
SINUS PAIN: 0
EYE ITCHING: 0
STRIDOR: 0
TROUBLE SWALLOWING: 1
CHEST TIGHTNESS: 0
EYE PAIN: 0
SORE THROAT: 1

## 2025-05-20 NOTE — PROGRESS NOTES
POC STREP GO A DIRECT, DNA PROBE      Results  Labs   - Strep test: Negative    Orders Placed This Encounter   Procedures    AMB POC STREP GO A DIRECT, DNA PROBE     Orders Placed This Encounter   Medications    amoxicillin (AMOXIL) 875 MG tablet     Sig: Take 1 tablet by mouth 2 times daily for 7 days     Dispense:  14 tablet     Refill:  0    methylPREDNISolone (MEDROL DOSEPACK) 4 MG tablet     Sig: Take by mouth.     Dispense:  1 kit     Refill:  0     There are no discontinued medications.  Return for worsening of condition, if symptoms do not improve in 3-5 days.        Reviewed with the patient/family: current clinical status & medications.  Side effects of the medication prescribed today, as well as treatment plan/rationale and result expectations have been discussed with the patient/family who expresses understanding. Patient will be discharged home in stable condition.    Follow up with PCP to evaluate treatment results or return if symptoms worsen or fail to improve. Discussed signs and symptoms which require immediate follow-up in ED/call to 911.  Understanding verbalized.     I have reviewed the patient's medical history in detail and updated the computerized patient record.    HERMINIA BRADEN, CARROLL - CNP  The patient (or guardian, if applicable) and other individuals in attendance with the patient were advised that Artificial Intelligence will be utilized during this visit to record, process the conversation to generate a clinical note, and support improvement of the AI technology. The patient (or guardian, if applicable) and other individuals in attendance at the appointment consented to the use of AI, including the recording.

## 2025-07-18 DIAGNOSIS — G40.909 NONINTRACTABLE EPILEPSY WITHOUT STATUS EPILEPTICUS, UNSPECIFIED EPILEPSY TYPE (HCC): ICD-10-CM

## 2025-07-18 RX ORDER — LEVETIRACETAM 500 MG/1
TABLET ORAL
Qty: 180 TABLET | Refills: 0 | Status: SHIPPED | OUTPATIENT
Start: 2025-07-18

## 2025-07-18 RX ORDER — LAMOTRIGINE 200 MG/1
TABLET ORAL
Qty: 180 TABLET | Refills: 0 | Status: SHIPPED | OUTPATIENT
Start: 2025-07-18

## 2025-07-21 ENCOUNTER — TELEPHONE (OUTPATIENT)
Age: 45
End: 2025-07-21

## 2025-07-21 DIAGNOSIS — G40.909 NONINTRACTABLE EPILEPSY WITHOUT STATUS EPILEPTICUS, UNSPECIFIED EPILEPSY TYPE (HCC): ICD-10-CM

## 2025-07-21 RX ORDER — LEVETIRACETAM 500 MG/1
500 TABLET ORAL 2 TIMES DAILY
Qty: 180 TABLET | Refills: 0 | Status: SHIPPED | OUTPATIENT
Start: 2025-07-21

## 2025-07-21 RX ORDER — LAMOTRIGINE 200 MG/1
200 TABLET ORAL 2 TIMES DAILY
Qty: 180 TABLET | Refills: 0 | Status: SHIPPED | OUTPATIENT
Start: 2025-07-21

## 2025-07-21 NOTE — TELEPHONE ENCOUNTER
For Levetiriacetam the directions say to be filled in by provider. It was not filled in by provider and no directions were written for the medication. Medication was written by Dr. Fong.  [phone]  Fax [phone]